# Patient Record
Sex: FEMALE | Race: BLACK OR AFRICAN AMERICAN
[De-identification: names, ages, dates, MRNs, and addresses within clinical notes are randomized per-mention and may not be internally consistent; named-entity substitution may affect disease eponyms.]

---

## 2018-03-30 ENCOUNTER — HOSPITAL ENCOUNTER (INPATIENT)
Dept: HOSPITAL 84 - D.M2 | Age: 53
LOS: 14 days | Discharge: HOME HEALTH | DRG: 871 | End: 2018-04-13
Attending: INTERNAL MEDICINE | Admitting: INTERNAL MEDICINE
Payer: MEDICARE

## 2018-03-30 VITALS
BODY MASS INDEX: 22.44 KG/M2 | BODY MASS INDEX: 22.44 KG/M2 | WEIGHT: 134.68 LBS | HEIGHT: 65 IN | WEIGHT: 134.68 LBS | HEIGHT: 65 IN | BODY MASS INDEX: 22.44 KG/M2

## 2018-03-30 VITALS — SYSTOLIC BLOOD PRESSURE: 131 MMHG | DIASTOLIC BLOOD PRESSURE: 70 MMHG

## 2018-03-30 VITALS — DIASTOLIC BLOOD PRESSURE: 85 MMHG | SYSTOLIC BLOOD PRESSURE: 201 MMHG

## 2018-03-30 DIAGNOSIS — N18.6: ICD-10-CM

## 2018-03-30 DIAGNOSIS — F12.90: ICD-10-CM

## 2018-03-30 DIAGNOSIS — Z99.2: ICD-10-CM

## 2018-03-30 DIAGNOSIS — H54.7: ICD-10-CM

## 2018-03-30 DIAGNOSIS — I13.2: ICD-10-CM

## 2018-03-30 DIAGNOSIS — I50.9: ICD-10-CM

## 2018-03-30 DIAGNOSIS — F32.9: ICD-10-CM

## 2018-03-30 DIAGNOSIS — F14.90: ICD-10-CM

## 2018-03-30 DIAGNOSIS — A41.02: Primary | ICD-10-CM

## 2018-03-30 DIAGNOSIS — N30.90: ICD-10-CM

## 2018-03-30 DIAGNOSIS — E11.22: ICD-10-CM

## 2018-03-30 DIAGNOSIS — A52.8: ICD-10-CM

## 2018-03-30 DIAGNOSIS — Z72.89: ICD-10-CM

## 2018-03-30 DIAGNOSIS — J44.9: ICD-10-CM

## 2018-03-30 DIAGNOSIS — L40.9: ICD-10-CM

## 2018-03-30 LAB
ANION GAP SERPL CALC-SCNC: 18.2 MMOL/L (ref 8–16)
BASOPHILS NFR BLD AUTO: 0.1 % (ref 0–2)
BUN SERPL-MCNC: 30 MG/DL (ref 7–18)
CALCIUM SERPL-MCNC: 7.2 MG/DL (ref 8.5–10.1)
CHLORIDE SERPL-SCNC: 99 MMOL/L (ref 98–107)
CO2 SERPL-SCNC: 23.5 MMOL/L (ref 21–32)
CREAT SERPL-MCNC: 4.4 MG/DL (ref 0.6–1.3)
EOSINOPHIL NFR BLD: 0 % (ref 0–7)
ERYTHROCYTE [DISTWIDTH] IN BLOOD BY AUTOMATED COUNT: 16.6 % (ref 11.5–14.5)
GLUCOSE SERPL-MCNC: 194 MG/DL (ref 74–106)
HCT VFR BLD CALC: 34.6 % (ref 36–48)
HGB BLD-MCNC: 11.1 G/DL (ref 12–16)
IMM GRANULOCYTES NFR BLD: 1.3 % (ref 0–5)
LYMPHOCYTES NFR BLD AUTO: 3 % (ref 15–50)
MCH RBC QN AUTO: 31.7 PG (ref 26–34)
MCHC RBC AUTO-ENTMCNC: 32.1 G/DL (ref 31–37)
MCV RBC: 98.9 FL (ref 80–100)
MONOCYTES NFR BLD: 4.5 % (ref 2–11)
NEUTROPHILS NFR BLD AUTO: 91.1 % (ref 40–80)
OSMOLALITY SERPL CALC.SUM OF ELEC: 284 MOSM/KG (ref 275–300)
PLATELET # BLD: 194 10X3/UL (ref 130–400)
PMV BLD AUTO: 10.9 FL (ref 7.4–10.4)
POTASSIUM SERPL-SCNC: 3.7 MMOL/L (ref 3.5–5.1)
RBC # BLD AUTO: 3.5 10X6/UL (ref 4–5.4)
SODIUM SERPL-SCNC: 137 MMOL/L (ref 136–145)
WBC # BLD AUTO: 16.5 10X3/UL (ref 4.8–10.8)

## 2018-03-31 VITALS — DIASTOLIC BLOOD PRESSURE: 67 MMHG | SYSTOLIC BLOOD PRESSURE: 135 MMHG

## 2018-03-31 VITALS — SYSTOLIC BLOOD PRESSURE: 124 MMHG | DIASTOLIC BLOOD PRESSURE: 49 MMHG

## 2018-03-31 VITALS — SYSTOLIC BLOOD PRESSURE: 129 MMHG | DIASTOLIC BLOOD PRESSURE: 79 MMHG

## 2018-03-31 VITALS — SYSTOLIC BLOOD PRESSURE: 138 MMHG | DIASTOLIC BLOOD PRESSURE: 84 MMHG

## 2018-03-31 VITALS — SYSTOLIC BLOOD PRESSURE: 132 MMHG | DIASTOLIC BLOOD PRESSURE: 74 MMHG

## 2018-03-31 LAB
AMORPHOUS SEDIMENT: (no result) /LPF
AMPHETAMINES UR QL SCN: NEGATIVE QUAL
APPEARANCE UR: (no result)
BACTERIA #/AREA URNS HPF: (no result) /HPF
BARBITURATES UR QL SCN: NEGATIVE QUAL
BENZODIAZ UR QL SCN: NEGATIVE QUAL
BILIRUB SERPL-MCNC: NEGATIVE MG/DL
BZE UR QL SCN: POSITIVE QUAL
CANNABINOIDS UR QL SCN: POSITIVE QUAL
COLOR UR: YELLOW
CRYSTALS #/AREA URNS HPF: (no result) /HPF
GLUCOSE SERPL-MCNC: 50 MG/DL
GRAN CASTS #/AREA URNS LPF: (no result) /LPF
HYALINE CASTS #/AREA URNS LPF: (no result) /LPF
KETONES UR STRIP-MCNC: NEGATIVE MG/DL
MUCOUS THREADS #/AREA URNS LPF: (no result) /LPF
NITRITE UR-MCNC: NEGATIVE MG/ML
OPIATES UR QL SCN: NEGATIVE QUAL
PCP UR QL SCN: NEGATIVE QUAL
PH UR STRIP: 5 [PH] (ref 5–6)
PROT UR-MCNC: (no result) MG/DL
RBC #/AREA URNS HPF: (no result) /HPF (ref 0–5)
SP GR UR STRIP: 1.02 (ref 1–1.02)
SQUAMOUS #/AREA URNS HPF: (no result) /HPF (ref 0–5)
UROBILINOGEN UR-MCNC: NORMAL MG/DL
WBC #/AREA URNS HPF: (no result) /HPF (ref 0–5)

## 2018-04-01 VITALS — DIASTOLIC BLOOD PRESSURE: 62 MMHG | SYSTOLIC BLOOD PRESSURE: 119 MMHG

## 2018-04-01 VITALS — SYSTOLIC BLOOD PRESSURE: 136 MMHG | DIASTOLIC BLOOD PRESSURE: 71 MMHG

## 2018-04-01 VITALS — DIASTOLIC BLOOD PRESSURE: 64 MMHG | SYSTOLIC BLOOD PRESSURE: 136 MMHG

## 2018-04-01 VITALS — SYSTOLIC BLOOD PRESSURE: 126 MMHG | DIASTOLIC BLOOD PRESSURE: 70 MMHG

## 2018-04-01 VITALS — SYSTOLIC BLOOD PRESSURE: 106 MMHG | DIASTOLIC BLOOD PRESSURE: 44 MMHG

## 2018-04-01 VITALS — DIASTOLIC BLOOD PRESSURE: 68 MMHG | SYSTOLIC BLOOD PRESSURE: 121 MMHG

## 2018-04-01 LAB
ANION GAP SERPL CALC-SCNC: 16.2 MMOL/L (ref 8–16)
BASOPHILS NFR BLD AUTO: 0.1 % (ref 0–2)
BUN SERPL-MCNC: 48 MG/DL (ref 7–18)
CALCIUM SERPL-MCNC: 6.4 MG/DL (ref 8.5–10.1)
CHLORIDE SERPL-SCNC: 95 MMOL/L (ref 98–107)
CO2 SERPL-SCNC: 22.9 MMOL/L (ref 21–32)
CREAT SERPL-MCNC: 6.9 MG/DL (ref 0.6–1.3)
EOSINOPHIL NFR BLD: 0.2 % (ref 0–7)
ERYTHROCYTE [DISTWIDTH] IN BLOOD BY AUTOMATED COUNT: 15.7 % (ref 11.5–14.5)
GLUCOSE SERPL-MCNC: 202 MG/DL (ref 74–106)
HCT VFR BLD CALC: 28.5 % (ref 36–48)
HGB BLD-MCNC: 9.4 G/DL (ref 12–16)
IMM GRANULOCYTES NFR BLD: 0.4 % (ref 0–5)
LYMPHOCYTES NFR BLD AUTO: 7 % (ref 15–50)
MCH RBC QN AUTO: 31.3 PG (ref 26–34)
MCHC RBC AUTO-ENTMCNC: 33 G/DL (ref 31–37)
MCV RBC: 95 FL (ref 80–100)
MONOCYTES NFR BLD: 10.9 % (ref 2–11)
NEUTROPHILS NFR BLD AUTO: 81.4 % (ref 40–80)
OSMOLALITY SERPL CALC.SUM OF ELEC: 281 MOSM/KG (ref 275–300)
PLATELET # BLD: 189 10X3/UL (ref 130–400)
PMV BLD AUTO: 11.1 FL (ref 7.4–10.4)
POTASSIUM SERPL-SCNC: 3.1 MMOL/L (ref 3.5–5.1)
RBC # BLD AUTO: 3 10X6/UL (ref 4–5.4)
SODIUM SERPL-SCNC: 131 MMOL/L (ref 136–145)
WBC # BLD AUTO: 11.1 10X3/UL (ref 4.8–10.8)

## 2018-04-02 VITALS — DIASTOLIC BLOOD PRESSURE: 59 MMHG | SYSTOLIC BLOOD PRESSURE: 101 MMHG

## 2018-04-02 VITALS — SYSTOLIC BLOOD PRESSURE: 115 MMHG | DIASTOLIC BLOOD PRESSURE: 52 MMHG

## 2018-04-02 VITALS — DIASTOLIC BLOOD PRESSURE: 65 MMHG | SYSTOLIC BLOOD PRESSURE: 115 MMHG

## 2018-04-02 VITALS — DIASTOLIC BLOOD PRESSURE: 52 MMHG | SYSTOLIC BLOOD PRESSURE: 115 MMHG

## 2018-04-02 VITALS — SYSTOLIC BLOOD PRESSURE: 122 MMHG | DIASTOLIC BLOOD PRESSURE: 57 MMHG

## 2018-04-02 VITALS — SYSTOLIC BLOOD PRESSURE: 130 MMHG | DIASTOLIC BLOOD PRESSURE: 62 MMHG

## 2018-04-02 LAB
ANION GAP SERPL CALC-SCNC: 18.1 MMOL/L (ref 8–16)
BASOPHILS NFR BLD AUTO: 0.3 % (ref 0–2)
BUN SERPL-MCNC: 52 MG/DL (ref 7–18)
CALCIUM SERPL-MCNC: 5.9 MG/DL (ref 8.5–10.1)
CHLORIDE SERPL-SCNC: 94 MMOL/L (ref 98–107)
CO2 SERPL-SCNC: 21.1 MMOL/L (ref 21–32)
CREAT SERPL-MCNC: 8.5 MG/DL (ref 0.6–1.3)
EOSINOPHIL NFR BLD: 2.2 % (ref 0–7)
ERYTHROCYTE [DISTWIDTH] IN BLOOD BY AUTOMATED COUNT: 15.4 % (ref 11.5–14.5)
GLUCOSE SERPL-MCNC: 110 MG/DL (ref 74–106)
HCT VFR BLD CALC: 25.6 % (ref 36–48)
HGB BLD-MCNC: 8.3 G/DL (ref 12–16)
IMM GRANULOCYTES NFR BLD: 0.6 % (ref 0–5)
LYMPHOCYTES NFR BLD AUTO: 8.8 % (ref 15–50)
MCH RBC QN AUTO: 30.5 PG (ref 26–34)
MCHC RBC AUTO-ENTMCNC: 32.4 G/DL (ref 31–37)
MCV RBC: 94.1 FL (ref 80–100)
MONOCYTES NFR BLD: 15.9 % (ref 2–11)
NEUTROPHILS NFR BLD AUTO: 72.2 % (ref 40–80)
OSMOLALITY SERPL CALC.SUM OF ELEC: 275 MOSM/KG (ref 275–300)
PHOSPHATE SERPL-MCNC: 5.7 MG/DL (ref 2.5–4.9)
PLATELET # BLD: 195 10X3/UL (ref 130–400)
PMV BLD AUTO: 11 FL (ref 7.4–10.4)
POTASSIUM SERPL-SCNC: 3.2 MMOL/L (ref 3.5–5.1)
RBC # BLD AUTO: 2.72 10X6/UL (ref 4–5.4)
SODIUM SERPL-SCNC: 130 MMOL/L (ref 136–145)
WBC # BLD AUTO: 6.9 10X3/UL (ref 4.8–10.8)

## 2018-04-02 PROCEDURE — 5A1D70Z PERFORMANCE OF URINARY FILTRATION, INTERMITTENT, LESS THAN 6 HOURS PER DAY: ICD-10-PCS | Performed by: INTERNAL MEDICINE

## 2018-04-03 VITALS — DIASTOLIC BLOOD PRESSURE: 56 MMHG | SYSTOLIC BLOOD PRESSURE: 108 MMHG

## 2018-04-03 VITALS — SYSTOLIC BLOOD PRESSURE: 136 MMHG | DIASTOLIC BLOOD PRESSURE: 76 MMHG

## 2018-04-03 VITALS — DIASTOLIC BLOOD PRESSURE: 72 MMHG | SYSTOLIC BLOOD PRESSURE: 142 MMHG

## 2018-04-03 VITALS — DIASTOLIC BLOOD PRESSURE: 58 MMHG | SYSTOLIC BLOOD PRESSURE: 117 MMHG

## 2018-04-03 VITALS — SYSTOLIC BLOOD PRESSURE: 125 MMHG | DIASTOLIC BLOOD PRESSURE: 68 MMHG

## 2018-04-03 VITALS — DIASTOLIC BLOOD PRESSURE: 61 MMHG | SYSTOLIC BLOOD PRESSURE: 136 MMHG

## 2018-04-03 LAB
ANION GAP SERPL CALC-SCNC: 14.4 MMOL/L (ref 8–16)
BASOPHILS NFR BLD AUTO: 0.7 % (ref 0–2)
BUN SERPL-MCNC: 36 MG/DL (ref 7–18)
CALCIUM SERPL-MCNC: 6.3 MG/DL (ref 8.5–10.1)
CHLORIDE SERPL-SCNC: 96 MMOL/L (ref 98–107)
CO2 SERPL-SCNC: 25.7 MMOL/L (ref 21–32)
CREAT SERPL-MCNC: 6.9 MG/DL (ref 0.6–1.3)
EOSINOPHIL NFR BLD: 3.2 % (ref 0–7)
ERYTHROCYTE [DISTWIDTH] IN BLOOD BY AUTOMATED COUNT: 15.4 % (ref 11.5–14.5)
GLUCOSE SERPL-MCNC: 102 MG/DL (ref 74–106)
HCT VFR BLD CALC: 27.5 % (ref 36–48)
HGB BLD-MCNC: 9 G/DL (ref 12–16)
IMM GRANULOCYTES NFR BLD: 0.7 % (ref 0–5)
LYMPHOCYTES NFR BLD AUTO: 16.7 % (ref 15–50)
MCH RBC QN AUTO: 30.5 PG (ref 26–34)
MCHC RBC AUTO-ENTMCNC: 32.7 G/DL (ref 31–37)
MCV RBC: 93.2 FL (ref 80–100)
MONOCYTES NFR BLD: 13.8 % (ref 2–11)
NEUTROPHILS NFR BLD AUTO: 64.9 % (ref 40–80)
OSMOLALITY SERPL CALC.SUM OF ELEC: 273 MOSM/KG (ref 275–300)
PHOSPHATE SERPL-MCNC: 4.6 MG/DL (ref 2.5–4.9)
PLATELET # BLD: 195 10X3/UL (ref 130–400)
PMV BLD AUTO: 10.4 FL (ref 7.4–10.4)
POTASSIUM SERPL-SCNC: 3.1 MMOL/L (ref 3.5–5.1)
RBC # BLD AUTO: 2.95 10X6/UL (ref 4–5.4)
SODIUM SERPL-SCNC: 133 MMOL/L (ref 136–145)
WBC # BLD AUTO: 5.6 10X3/UL (ref 4.8–10.8)

## 2018-04-04 VITALS — SYSTOLIC BLOOD PRESSURE: 134 MMHG | DIASTOLIC BLOOD PRESSURE: 53 MMHG

## 2018-04-04 VITALS — DIASTOLIC BLOOD PRESSURE: 58 MMHG | SYSTOLIC BLOOD PRESSURE: 93 MMHG

## 2018-04-04 VITALS — DIASTOLIC BLOOD PRESSURE: 63 MMHG | SYSTOLIC BLOOD PRESSURE: 123 MMHG

## 2018-04-04 VITALS — SYSTOLIC BLOOD PRESSURE: 113 MMHG | DIASTOLIC BLOOD PRESSURE: 60 MMHG

## 2018-04-04 VITALS — SYSTOLIC BLOOD PRESSURE: 112 MMHG | DIASTOLIC BLOOD PRESSURE: 60 MMHG

## 2018-04-04 VITALS — SYSTOLIC BLOOD PRESSURE: 116 MMHG | DIASTOLIC BLOOD PRESSURE: 64 MMHG

## 2018-04-04 LAB
ANION GAP SERPL CALC-SCNC: 17.4 MMOL/L (ref 8–16)
BASOPHILS NFR BLD AUTO: 0.4 % (ref 0–2)
BUN SERPL-MCNC: 42 MG/DL (ref 7–18)
CALCIUM SERPL-MCNC: 6.2 MG/DL (ref 8.5–10.1)
CHLORIDE SERPL-SCNC: 96 MMOL/L (ref 98–107)
CO2 SERPL-SCNC: 23.1 MMOL/L (ref 21–32)
CREAT SERPL-MCNC: 7.9 MG/DL (ref 0.6–1.3)
EOSINOPHIL NFR BLD: 4.8 % (ref 0–7)
ERYTHROCYTE [DISTWIDTH] IN BLOOD BY AUTOMATED COUNT: 15.3 % (ref 11.5–14.5)
GLUCOSE SERPL-MCNC: 98 MG/DL (ref 74–106)
HCT VFR BLD CALC: 25.6 % (ref 36–48)
HGB BLD-MCNC: 8.3 G/DL (ref 12–16)
IMM GRANULOCYTES NFR BLD: 0.6 % (ref 0–5)
LYMPHOCYTES NFR BLD AUTO: 17.8 % (ref 15–50)
MCH RBC QN AUTO: 30.5 PG (ref 26–34)
MCHC RBC AUTO-ENTMCNC: 32.4 G/DL (ref 31–37)
MCV RBC: 94.1 FL (ref 80–100)
MONOCYTES NFR BLD: 16 % (ref 2–11)
NEUTROPHILS NFR BLD AUTO: 60.4 % (ref 40–80)
OSMOLALITY SERPL CALC.SUM OF ELEC: 276 MOSM/KG (ref 275–300)
PHOSPHATE SERPL-MCNC: 5 MG/DL (ref 2.5–4.9)
PLATELET # BLD: 213 10X3/UL (ref 130–400)
PMV BLD AUTO: 9.9 FL (ref 7.4–10.4)
POTASSIUM SERPL-SCNC: 3.5 MMOL/L (ref 3.5–5.1)
RBC # BLD AUTO: 2.72 10X6/UL (ref 4–5.4)
SODIUM SERPL-SCNC: 133 MMOL/L (ref 136–145)
VANCOMYCIN SERPL-MCNC: 27.7 UG/ML (ref 10–20)
WBC # BLD AUTO: 5.5 10X3/UL (ref 4.8–10.8)

## 2018-04-05 VITALS — DIASTOLIC BLOOD PRESSURE: 58 MMHG | SYSTOLIC BLOOD PRESSURE: 103 MMHG

## 2018-04-05 VITALS — SYSTOLIC BLOOD PRESSURE: 143 MMHG | DIASTOLIC BLOOD PRESSURE: 93 MMHG

## 2018-04-05 VITALS — DIASTOLIC BLOOD PRESSURE: 57 MMHG | SYSTOLIC BLOOD PRESSURE: 120 MMHG

## 2018-04-05 VITALS — SYSTOLIC BLOOD PRESSURE: 137 MMHG | DIASTOLIC BLOOD PRESSURE: 59 MMHG

## 2018-04-05 VITALS — SYSTOLIC BLOOD PRESSURE: 125 MMHG | DIASTOLIC BLOOD PRESSURE: 69 MMHG

## 2018-04-05 VITALS — DIASTOLIC BLOOD PRESSURE: 74 MMHG | SYSTOLIC BLOOD PRESSURE: 134 MMHG

## 2018-04-05 LAB
ANION GAP SERPL CALC-SCNC: 15.2 MMOL/L (ref 8–16)
BASOPHILS NFR BLD AUTO: 0.4 % (ref 0–2)
BUN SERPL-MCNC: 27 MG/DL (ref 7–18)
CALCIUM SERPL-MCNC: 7 MG/DL (ref 8.5–10.1)
CHLORIDE SERPL-SCNC: 98 MMOL/L (ref 98–107)
CO2 SERPL-SCNC: 26.6 MMOL/L (ref 21–32)
CREAT SERPL-MCNC: 5.8 MG/DL (ref 0.6–1.3)
EOSINOPHIL NFR BLD: 4.3 % (ref 0–7)
ERYTHROCYTE [DISTWIDTH] IN BLOOD BY AUTOMATED COUNT: 15 % (ref 11.5–14.5)
GLUCOSE SERPL-MCNC: 100 MG/DL (ref 74–106)
HCT VFR BLD CALC: 27.4 % (ref 36–48)
HGB BLD-MCNC: 8.9 G/DL (ref 12–16)
IMM GRANULOCYTES NFR BLD: 1.1 % (ref 0–5)
LYMPHOCYTES NFR BLD AUTO: 16.1 % (ref 15–50)
MCH RBC QN AUTO: 30.5 PG (ref 26–34)
MCHC RBC AUTO-ENTMCNC: 32.5 G/DL (ref 31–37)
MCV RBC: 93.8 FL (ref 80–100)
MONOCYTES NFR BLD: 16.1 % (ref 2–11)
NEUTROPHILS NFR BLD AUTO: 62 % (ref 40–80)
OSMOLALITY SERPL CALC.SUM OF ELEC: 276 MOSM/KG (ref 275–300)
PLATELET # BLD: 244 10X3/UL (ref 130–400)
PMV BLD AUTO: 10.1 FL (ref 7.4–10.4)
POTASSIUM SERPL-SCNC: 3.8 MMOL/L (ref 3.5–5.1)
RBC # BLD AUTO: 2.92 10X6/UL (ref 4–5.4)
SODIUM SERPL-SCNC: 136 MMOL/L (ref 136–145)
VANCOMYCIN PEAK SERPL-MCNC: 21.4 UG/ML (ref 18–26)
WBC # BLD AUTO: 5.5 10X3/UL (ref 4.8–10.8)

## 2018-04-06 VITALS — SYSTOLIC BLOOD PRESSURE: 136 MMHG | DIASTOLIC BLOOD PRESSURE: 66 MMHG

## 2018-04-06 VITALS — SYSTOLIC BLOOD PRESSURE: 136 MMHG | DIASTOLIC BLOOD PRESSURE: 76 MMHG

## 2018-04-06 VITALS — DIASTOLIC BLOOD PRESSURE: 74 MMHG | SYSTOLIC BLOOD PRESSURE: 133 MMHG

## 2018-04-06 VITALS — SYSTOLIC BLOOD PRESSURE: 128 MMHG | DIASTOLIC BLOOD PRESSURE: 74 MMHG

## 2018-04-06 VITALS — SYSTOLIC BLOOD PRESSURE: 130 MMHG | DIASTOLIC BLOOD PRESSURE: 62 MMHG

## 2018-04-06 VITALS — DIASTOLIC BLOOD PRESSURE: 72 MMHG | SYSTOLIC BLOOD PRESSURE: 132 MMHG

## 2018-04-06 LAB
ANION GAP SERPL CALC-SCNC: 14.5 MMOL/L (ref 8–16)
BASOPHILS NFR BLD AUTO: 0.6 % (ref 0–2)
BUN SERPL-MCNC: 33 MG/DL (ref 7–18)
CALCIUM SERPL-MCNC: 7 MG/DL (ref 8.5–10.1)
CHLORIDE SERPL-SCNC: 98 MMOL/L (ref 98–107)
CO2 SERPL-SCNC: 26.1 MMOL/L (ref 21–32)
CREAT SERPL-MCNC: 7 MG/DL (ref 0.6–1.3)
EOSINOPHIL NFR BLD: 5.3 % (ref 0–7)
ERYTHROCYTE [DISTWIDTH] IN BLOOD BY AUTOMATED COUNT: 15 % (ref 11.5–14.5)
GLUCOSE SERPL-MCNC: 106 MG/DL (ref 74–106)
HCT VFR BLD CALC: 27.8 % (ref 36–48)
HGB BLD-MCNC: 9 G/DL (ref 12–16)
IMM GRANULOCYTES NFR BLD: 0.8 % (ref 0–5)
LYMPHOCYTES NFR BLD AUTO: 24.8 % (ref 15–50)
MCH RBC QN AUTO: 30.6 PG (ref 26–34)
MCHC RBC AUTO-ENTMCNC: 32.4 G/DL (ref 31–37)
MCV RBC: 94.6 FL (ref 80–100)
MONOCYTES NFR BLD: 11.8 % (ref 2–11)
NEUTROPHILS NFR BLD AUTO: 56.7 % (ref 40–80)
OSMOLALITY SERPL CALC.SUM OF ELEC: 276 MOSM/KG (ref 275–300)
PLATELET # BLD: 292 10X3/UL (ref 130–400)
PMV BLD AUTO: 9.9 FL (ref 7.4–10.4)
POTASSIUM SERPL-SCNC: 3.6 MMOL/L (ref 3.5–5.1)
RBC # BLD AUTO: 2.94 10X6/UL (ref 4–5.4)
SODIUM SERPL-SCNC: 135 MMOL/L (ref 136–145)
VANCOMYCIN PEAK SERPL-MCNC: 32.5 UG/ML (ref 18–26)
WBC # BLD AUTO: 5.2 10X3/UL (ref 4.8–10.8)

## 2018-04-07 VITALS — SYSTOLIC BLOOD PRESSURE: 142 MMHG | DIASTOLIC BLOOD PRESSURE: 78 MMHG

## 2018-04-07 VITALS — DIASTOLIC BLOOD PRESSURE: 78 MMHG | SYSTOLIC BLOOD PRESSURE: 151 MMHG

## 2018-04-07 VITALS — SYSTOLIC BLOOD PRESSURE: 147 MMHG | DIASTOLIC BLOOD PRESSURE: 62 MMHG

## 2018-04-07 VITALS — DIASTOLIC BLOOD PRESSURE: 72 MMHG | SYSTOLIC BLOOD PRESSURE: 134 MMHG

## 2018-04-07 VITALS — DIASTOLIC BLOOD PRESSURE: 81 MMHG | SYSTOLIC BLOOD PRESSURE: 157 MMHG

## 2018-04-07 LAB
ANION GAP SERPL CALC-SCNC: 14 MMOL/L (ref 8–16)
BASOPHILS NFR BLD AUTO: 0.3 % (ref 0–2)
BUN SERPL-MCNC: 25 MG/DL (ref 7–18)
CALCIUM SERPL-MCNC: 7.1 MG/DL (ref 8.5–10.1)
CHLORIDE SERPL-SCNC: 99 MMOL/L (ref 98–107)
CO2 SERPL-SCNC: 27.8 MMOL/L (ref 21–32)
CREAT SERPL-MCNC: 5.2 MG/DL (ref 0.6–1.3)
EOSINOPHIL NFR BLD: 3.8 % (ref 0–7)
ERYTHROCYTE [DISTWIDTH] IN BLOOD BY AUTOMATED COUNT: 14.9 % (ref 11.5–14.5)
GLUCOSE SERPL-MCNC: 114 MG/DL (ref 74–106)
HCT VFR BLD CALC: 26.8 % (ref 36–48)
HGB BLD-MCNC: 8.7 G/DL (ref 12–16)
IMM GRANULOCYTES NFR BLD: 0.8 % (ref 0–5)
LYMPHOCYTES NFR BLD AUTO: 15.2 % (ref 15–50)
MCH RBC QN AUTO: 30.2 PG (ref 26–34)
MCHC RBC AUTO-ENTMCNC: 32.5 G/DL (ref 31–37)
MCV RBC: 93.1 FL (ref 80–100)
MONOCYTES NFR BLD: 12.8 % (ref 2–11)
NEUTROPHILS NFR BLD AUTO: 67.1 % (ref 40–80)
OSMOLALITY SERPL CALC.SUM OF ELEC: 278 MOSM/KG (ref 275–300)
PLATELET # BLD: 321 10X3/UL (ref 130–400)
PMV BLD AUTO: 9.5 FL (ref 7.4–10.4)
POTASSIUM SERPL-SCNC: 3.8 MMOL/L (ref 3.5–5.1)
RBC # BLD AUTO: 2.88 10X6/UL (ref 4–5.4)
SODIUM SERPL-SCNC: 137 MMOL/L (ref 136–145)
VANCOMYCIN PEAK SERPL-MCNC: 26.5 UG/ML (ref 18–26)
WBC # BLD AUTO: 6.4 10X3/UL (ref 4.8–10.8)

## 2018-04-08 VITALS — SYSTOLIC BLOOD PRESSURE: 131 MMHG | DIASTOLIC BLOOD PRESSURE: 71 MMHG

## 2018-04-08 VITALS — SYSTOLIC BLOOD PRESSURE: 155 MMHG | DIASTOLIC BLOOD PRESSURE: 83 MMHG

## 2018-04-08 VITALS — DIASTOLIC BLOOD PRESSURE: 74 MMHG | SYSTOLIC BLOOD PRESSURE: 131 MMHG

## 2018-04-08 VITALS — DIASTOLIC BLOOD PRESSURE: 65 MMHG | SYSTOLIC BLOOD PRESSURE: 120 MMHG

## 2018-04-08 LAB
ANION GAP SERPL CALC-SCNC: 16.5 MMOL/L (ref 8–16)
BASOPHILS NFR BLD AUTO: 0.5 % (ref 0–2)
BUN SERPL-MCNC: 31 MG/DL (ref 7–18)
CALCIUM SERPL-MCNC: 7.3 MG/DL (ref 8.5–10.1)
CHLORIDE SERPL-SCNC: 99 MMOL/L (ref 98–107)
CO2 SERPL-SCNC: 26.3 MMOL/L (ref 21–32)
CREAT SERPL-MCNC: 6.5 MG/DL (ref 0.6–1.3)
EOSINOPHIL NFR BLD: 3 % (ref 0–7)
ERYTHROCYTE [DISTWIDTH] IN BLOOD BY AUTOMATED COUNT: 15 % (ref 11.5–14.5)
FERRITIN SERPL-MCNC: 292 NG/ML (ref 3–244)
GLUCOSE SERPL-MCNC: 101 MG/DL (ref 74–106)
HCT VFR BLD CALC: 27.7 % (ref 36–48)
HGB BLD-MCNC: 8.9 G/DL (ref 12–16)
IMM GRANULOCYTES NFR BLD: 0.8 % (ref 0–5)
IRON SERPL-MCNC: 60 UG/DL (ref 35–150)
LYMPHOCYTES NFR BLD AUTO: 17.6 % (ref 15–50)
MCH RBC QN AUTO: 30.3 PG (ref 26–34)
MCHC RBC AUTO-ENTMCNC: 32.1 G/DL (ref 31–37)
MCV RBC: 94.2 FL (ref 80–100)
MONOCYTES NFR BLD: 10.5 % (ref 2–11)
NEUTROPHILS NFR BLD AUTO: 67.6 % (ref 40–80)
OSMOLALITY SERPL CALC.SUM OF ELEC: 282 MOSM/KG (ref 275–300)
PHOSPHATE SERPL-MCNC: 4.2 MG/DL (ref 2.5–4.9)
PLATELET # BLD: 402 10X3/UL (ref 130–400)
PMV BLD AUTO: 9.4 FL (ref 7.4–10.4)
POTASSIUM SERPL-SCNC: 3.8 MMOL/L (ref 3.5–5.1)
RBC # BLD AUTO: 2.94 10X6/UL (ref 4–5.4)
SAO2 % BLD FROM PO2: 31 % (ref 15–55)
SODIUM SERPL-SCNC: 138 MMOL/L (ref 136–145)
TIBC SERPL-MCNC: 189 UG/DL (ref 260–445)
UIBC SERPL-MCNC: 129 UG/DL (ref 150–375)
VANCOMYCIN PEAK SERPL-MCNC: 23.3 UG/ML (ref 18–26)
WBC # BLD AUTO: 6.3 10X3/UL (ref 4.8–10.8)

## 2018-04-09 VITALS — DIASTOLIC BLOOD PRESSURE: 75 MMHG | SYSTOLIC BLOOD PRESSURE: 139 MMHG

## 2018-04-09 VITALS — SYSTOLIC BLOOD PRESSURE: 126 MMHG | DIASTOLIC BLOOD PRESSURE: 79 MMHG

## 2018-04-09 VITALS — DIASTOLIC BLOOD PRESSURE: 73 MMHG | SYSTOLIC BLOOD PRESSURE: 124 MMHG

## 2018-04-09 VITALS — DIASTOLIC BLOOD PRESSURE: 76 MMHG | SYSTOLIC BLOOD PRESSURE: 129 MMHG

## 2018-04-09 VITALS — SYSTOLIC BLOOD PRESSURE: 131 MMHG | DIASTOLIC BLOOD PRESSURE: 72 MMHG

## 2018-04-09 LAB
ANION GAP SERPL CALC-SCNC: 17.4 MMOL/L (ref 8–16)
BASOPHILS NFR BLD AUTO: 0.8 % (ref 0–2)
BUN SERPL-MCNC: 41 MG/DL (ref 7–18)
CALCIUM SERPL-MCNC: 7.8 MG/DL (ref 8.5–10.1)
CHLORIDE SERPL-SCNC: 97 MMOL/L (ref 98–107)
CO2 SERPL-SCNC: 26.3 MMOL/L (ref 21–32)
CREAT SERPL-MCNC: 7.2 MG/DL (ref 0.6–1.3)
EOSINOPHIL NFR BLD: 3.1 % (ref 0–7)
ERYTHROCYTE [DISTWIDTH] IN BLOOD BY AUTOMATED COUNT: 15 % (ref 11.5–14.5)
GLUCOSE SERPL-MCNC: 100 MG/DL (ref 74–106)
HCT VFR BLD CALC: 29.2 % (ref 36–48)
HGB BLD-MCNC: 9.5 G/DL (ref 12–16)
IMM GRANULOCYTES NFR BLD: 0.8 % (ref 0–5)
INR PPP: 0.96 (ref 0.85–1.17)
LYMPHOCYTES NFR BLD AUTO: 22.5 % (ref 15–50)
MCH RBC QN AUTO: 30.4 PG (ref 26–34)
MCHC RBC AUTO-ENTMCNC: 32.5 G/DL (ref 31–37)
MCV RBC: 93.3 FL (ref 80–100)
MONOCYTES NFR BLD: 5.5 % (ref 2–11)
NEUTROPHILS NFR BLD AUTO: 67.3 % (ref 40–80)
OSMOLALITY SERPL CALC.SUM OF ELEC: 281 MOSM/KG (ref 275–300)
PLATELET # BLD: 458 10X3/UL (ref 130–400)
PMV BLD AUTO: 9.6 FL (ref 7.4–10.4)
POTASSIUM SERPL-SCNC: 4.7 MMOL/L (ref 3.5–5.1)
PROTHROMBIN TIME: 12.4 SECONDS (ref 11.6–15)
RBC # BLD AUTO: 3.13 10X6/UL (ref 4–5.4)
SODIUM SERPL-SCNC: 136 MMOL/L (ref 136–145)
VANCOMYCIN PEAK SERPL-MCNC: 19.8 UG/ML (ref 18–26)
WBC # BLD AUTO: 7.1 10X3/UL (ref 4.8–10.8)

## 2018-04-10 VITALS — DIASTOLIC BLOOD PRESSURE: 56 MMHG | SYSTOLIC BLOOD PRESSURE: 140 MMHG

## 2018-04-10 VITALS — DIASTOLIC BLOOD PRESSURE: 82 MMHG | SYSTOLIC BLOOD PRESSURE: 140 MMHG

## 2018-04-10 VITALS — DIASTOLIC BLOOD PRESSURE: 69 MMHG | SYSTOLIC BLOOD PRESSURE: 128 MMHG

## 2018-04-10 VITALS — DIASTOLIC BLOOD PRESSURE: 78 MMHG | SYSTOLIC BLOOD PRESSURE: 136 MMHG

## 2018-04-10 VITALS — SYSTOLIC BLOOD PRESSURE: 140 MMHG | DIASTOLIC BLOOD PRESSURE: 82 MMHG

## 2018-04-10 VITALS — DIASTOLIC BLOOD PRESSURE: 79 MMHG | SYSTOLIC BLOOD PRESSURE: 150 MMHG

## 2018-04-10 VITALS — DIASTOLIC BLOOD PRESSURE: 77 MMHG | SYSTOLIC BLOOD PRESSURE: 142 MMHG

## 2018-04-10 LAB
ANION GAP SERPL CALC-SCNC: 13.3 MMOL/L (ref 8–16)
BASOPHILS NFR BLD AUTO: 1 % (ref 0–2)
BUN SERPL-MCNC: 17 MG/DL (ref 7–18)
CALCIUM SERPL-MCNC: 7.2 MG/DL (ref 8.5–10.1)
CHLORIDE SERPL-SCNC: 101 MMOL/L (ref 98–107)
CO2 SERPL-SCNC: 28.9 MMOL/L (ref 21–32)
CREAT SERPL-MCNC: 4.5 MG/DL (ref 0.6–1.3)
EOSINOPHIL NFR BLD: 1.6 % (ref 0–7)
ERYTHROCYTE [DISTWIDTH] IN BLOOD BY AUTOMATED COUNT: 14.9 % (ref 11.5–14.5)
FOLATE SERPL-MCNC: 8.1 NG/ML (ref 3–?)
GLUCOSE SERPL-MCNC: 199 MG/DL (ref 74–106)
HCT VFR BLD CALC: 24.8 % (ref 36–48)
HGB BLD-MCNC: 8 G/DL (ref 12–16)
IMM GRANULOCYTES NFR BLD: 0.6 % (ref 0–5)
LYMPHOCYTES NFR BLD AUTO: 13.9 % (ref 15–50)
MCH RBC QN AUTO: 30.7 PG (ref 26–34)
MCHC RBC AUTO-ENTMCNC: 32.3 G/DL (ref 31–37)
MCV RBC: 95 FL (ref 80–100)
MONOCYTES NFR BLD: 7.1 % (ref 2–11)
NEUTROPHILS NFR BLD AUTO: 75.8 % (ref 40–80)
OSMOLALITY SERPL CALC.SUM OF ELEC: 285 MOSM/KG (ref 275–300)
PHOSPHATE SERPL-MCNC: 3.4 MG/DL (ref 2.5–4.9)
PLATELET # BLD: 406 10X3/UL (ref 130–400)
PMV BLD AUTO: 8.8 FL (ref 7.4–10.4)
POTASSIUM SERPL-SCNC: 4.2 MMOL/L (ref 3.5–5.1)
RBC # BLD AUTO: 2.61 10X6/UL (ref 4–5.4)
SODIUM SERPL-SCNC: 139 MMOL/L (ref 136–145)
VANCOMYCIN PEAK SERPL-MCNC: 14.1 UG/ML (ref 18–26)
VANCOMYCIN SERPL-MCNC: 14.1 UG/ML (ref 10–20)
VIT B12 SERPL-MCNC: 1412 PG/ML (ref 232–1245)
WBC # BLD AUTO: 7.1 10X3/UL (ref 4.8–10.8)

## 2018-04-10 PROCEDURE — 0J2SXYZ CHANGE OTHER DEVICE IN HEAD AND NECK SUBCUTANEOUS TISSUE AND FASCIA, EXTERNAL APPROACH: ICD-10-PCS | Performed by: SURGERY

## 2018-04-11 VITALS — SYSTOLIC BLOOD PRESSURE: 145 MMHG | DIASTOLIC BLOOD PRESSURE: 76 MMHG

## 2018-04-11 VITALS — SYSTOLIC BLOOD PRESSURE: 145 MMHG | DIASTOLIC BLOOD PRESSURE: 71 MMHG

## 2018-04-11 VITALS — DIASTOLIC BLOOD PRESSURE: 75 MMHG | SYSTOLIC BLOOD PRESSURE: 140 MMHG

## 2018-04-11 VITALS — DIASTOLIC BLOOD PRESSURE: 78 MMHG | SYSTOLIC BLOOD PRESSURE: 123 MMHG

## 2018-04-11 VITALS — DIASTOLIC BLOOD PRESSURE: 53 MMHG | SYSTOLIC BLOOD PRESSURE: 127 MMHG

## 2018-04-11 VITALS — SYSTOLIC BLOOD PRESSURE: 161 MMHG | DIASTOLIC BLOOD PRESSURE: 93 MMHG

## 2018-04-11 LAB
ANION GAP SERPL CALC-SCNC: 18.2 MMOL/L (ref 8–16)
BASOPHILS NFR BLD AUTO: 0.3 % (ref 0–2)
BUN SERPL-MCNC: 30 MG/DL (ref 7–18)
CALCIUM SERPL-MCNC: 8.1 MG/DL (ref 8.5–10.1)
CHLORIDE SERPL-SCNC: 98 MMOL/L (ref 98–107)
CO2 SERPL-SCNC: 25.8 MMOL/L (ref 21–32)
CREAT SERPL-MCNC: 5.6 MG/DL (ref 0.6–1.3)
EOSINOPHIL NFR BLD: 0.2 % (ref 0–7)
ERYTHROCYTE [DISTWIDTH] IN BLOOD BY AUTOMATED COUNT: 15.3 % (ref 11.5–14.5)
GLUCOSE SERPL-MCNC: 40 MG/DL (ref 74–106)
HCT VFR BLD CALC: 28.7 % (ref 36–48)
HGB BLD-MCNC: 9 G/DL (ref 12–16)
IMM GRANULOCYTES NFR BLD: 0.6 % (ref 0–5)
LYMPHOCYTES NFR BLD AUTO: 12.2 % (ref 15–50)
MCH RBC QN AUTO: 30.2 PG (ref 26–34)
MCHC RBC AUTO-ENTMCNC: 31.4 G/DL (ref 31–37)
MCV RBC: 96.3 FL (ref 80–100)
MONOCYTES NFR BLD: 6.4 % (ref 2–11)
NEUTROPHILS NFR BLD AUTO: 80.3 % (ref 40–80)
OSMOLALITY SERPL CALC.SUM OF ELEC: 278 MOSM/KG (ref 275–300)
PLATELET # BLD: 539 10X3/UL (ref 130–400)
PMV BLD AUTO: 9.3 FL (ref 7.4–10.4)
POTASSIUM SERPL-SCNC: 4 MMOL/L (ref 3.5–5.1)
RBC # BLD AUTO: 2.98 10X6/UL (ref 4–5.4)
SODIUM SERPL-SCNC: 138 MMOL/L (ref 136–145)
VANCOMYCIN PEAK SERPL-MCNC: 13.3 UG/ML (ref 18–26)
WBC # BLD AUTO: 12 10X3/UL (ref 4.8–10.8)

## 2018-04-12 VITALS — DIASTOLIC BLOOD PRESSURE: 85 MMHG | SYSTOLIC BLOOD PRESSURE: 155 MMHG

## 2018-04-12 VITALS — DIASTOLIC BLOOD PRESSURE: 70 MMHG | SYSTOLIC BLOOD PRESSURE: 130 MMHG

## 2018-04-12 VITALS — SYSTOLIC BLOOD PRESSURE: 138 MMHG | DIASTOLIC BLOOD PRESSURE: 77 MMHG

## 2018-04-12 VITALS — SYSTOLIC BLOOD PRESSURE: 130 MMHG | DIASTOLIC BLOOD PRESSURE: 70 MMHG

## 2018-04-12 LAB
ANION GAP SERPL CALC-SCNC: 12.3 MMOL/L (ref 8–16)
BASOPHILS NFR BLD AUTO: 0.6 % (ref 0–2)
BUN SERPL-MCNC: 22 MG/DL (ref 7–18)
CALCIUM SERPL-MCNC: 7.8 MG/DL (ref 8.5–10.1)
CHLORIDE SERPL-SCNC: 102 MMOL/L (ref 98–107)
CO2 SERPL-SCNC: 29.1 MMOL/L (ref 21–32)
CREAT SERPL-MCNC: 4.1 MG/DL (ref 0.6–1.3)
EOSINOPHIL NFR BLD: 1.5 % (ref 0–7)
ERYTHROCYTE [DISTWIDTH] IN BLOOD BY AUTOMATED COUNT: 15.5 % (ref 11.5–14.5)
GLUCOSE SERPL-MCNC: 115 MG/DL (ref 74–106)
HCT VFR BLD CALC: 25.5 % (ref 36–48)
HGB BLD-MCNC: 8 G/DL (ref 12–16)
IMM GRANULOCYTES NFR BLD: 0.7 % (ref 0–5)
LYMPHOCYTES NFR BLD AUTO: 17.1 % (ref 15–50)
MCH RBC QN AUTO: 30.5 PG (ref 26–34)
MCHC RBC AUTO-ENTMCNC: 31.4 G/DL (ref 31–37)
MCV RBC: 97.3 FL (ref 80–100)
MONOCYTES NFR BLD: 6.7 % (ref 2–11)
NEUTROPHILS NFR BLD AUTO: 73.4 % (ref 40–80)
OSMOLALITY SERPL CALC.SUM OF ELEC: 281 MOSM/KG (ref 275–300)
PHOSPHATE SERPL-MCNC: 2.9 MG/DL (ref 2.5–4.9)
PLATELET # BLD: 441 10X3/UL (ref 130–400)
PMV BLD AUTO: 8.9 FL (ref 7.4–10.4)
POTASSIUM SERPL-SCNC: 4.4 MMOL/L (ref 3.5–5.1)
RBC # BLD AUTO: 2.62 10X6/UL (ref 4–5.4)
SODIUM SERPL-SCNC: 139 MMOL/L (ref 136–145)
VANCOMYCIN PEAK SERPL-MCNC: 19.1 UG/ML (ref 18–26)
VANCOMYCIN SERPL-MCNC: 19.1 UG/ML (ref 10–20)
WBC # BLD AUTO: 8.4 10X3/UL (ref 4.8–10.8)

## 2018-04-13 VITALS — DIASTOLIC BLOOD PRESSURE: 70 MMHG | SYSTOLIC BLOOD PRESSURE: 140 MMHG

## 2018-04-13 VITALS — DIASTOLIC BLOOD PRESSURE: 47 MMHG | SYSTOLIC BLOOD PRESSURE: 148 MMHG

## 2018-04-13 VITALS — SYSTOLIC BLOOD PRESSURE: 140 MMHG | DIASTOLIC BLOOD PRESSURE: 80 MMHG

## 2018-04-13 VITALS — SYSTOLIC BLOOD PRESSURE: 142 MMHG | DIASTOLIC BLOOD PRESSURE: 72 MMHG

## 2018-04-13 LAB
ANION GAP SERPL CALC-SCNC: 11.4 MMOL/L (ref 8–16)
BASOPHILS NFR BLD AUTO: 0.6 % (ref 0–2)
BUN SERPL-MCNC: 40 MG/DL (ref 7–18)
CALCIUM SERPL-MCNC: 8.3 MG/DL (ref 8.5–10.1)
CHLORIDE SERPL-SCNC: 104 MMOL/L (ref 98–107)
CO2 SERPL-SCNC: 27.8 MMOL/L (ref 21–32)
CREAT SERPL-MCNC: 5.3 MG/DL (ref 0.6–1.3)
EOSINOPHIL NFR BLD: 1.6 % (ref 0–7)
ERYTHROCYTE [DISTWIDTH] IN BLOOD BY AUTOMATED COUNT: 15.8 % (ref 11.5–14.5)
GLUCOSE SERPL-MCNC: 156 MG/DL (ref 74–106)
HCT VFR BLD CALC: 24.9 % (ref 36–48)
HGB BLD-MCNC: 7.9 G/DL (ref 12–16)
IMM GRANULOCYTES NFR BLD: 0.7 % (ref 0–5)
LYMPHOCYTES NFR BLD AUTO: 15.7 % (ref 15–50)
MCH RBC QN AUTO: 30.6 PG (ref 26–34)
MCHC RBC AUTO-ENTMCNC: 31.7 G/DL (ref 31–37)
MCV RBC: 96.5 FL (ref 80–100)
MONOCYTES NFR BLD: 8.3 % (ref 2–11)
NEUTROPHILS NFR BLD AUTO: 73.1 % (ref 40–80)
OSMOLALITY SERPL CALC.SUM OF ELEC: 290 MOSM/KG (ref 275–300)
PLATELET # BLD: 496 10X3/UL (ref 130–400)
PMV BLD AUTO: 9 FL (ref 7.4–10.4)
POTASSIUM SERPL-SCNC: 4.2 MMOL/L (ref 3.5–5.1)
RBC # BLD AUTO: 2.58 10X6/UL (ref 4–5.4)
SODIUM SERPL-SCNC: 139 MMOL/L (ref 136–145)
WBC # BLD AUTO: 9.4 10X3/UL (ref 4.8–10.8)

## 2018-11-16 ENCOUNTER — HOSPITAL ENCOUNTER (INPATIENT)
Dept: HOSPITAL 84 - D.ER | Age: 53
LOS: 16 days | Discharge: ANOTHER HOSPITAL | DRG: 239 | End: 2018-12-02
Attending: INTERNAL MEDICINE | Admitting: INTERNAL MEDICINE
Payer: MEDICARE

## 2018-11-16 VITALS — HEIGHT: 65 IN | BODY MASS INDEX: 20.8 KG/M2 | BODY MASS INDEX: 20.8 KG/M2 | HEIGHT: 65 IN

## 2018-11-16 VITALS — SYSTOLIC BLOOD PRESSURE: 104 MMHG | DIASTOLIC BLOOD PRESSURE: 48 MMHG

## 2018-11-16 VITALS — SYSTOLIC BLOOD PRESSURE: 96 MMHG | DIASTOLIC BLOOD PRESSURE: 60 MMHG

## 2018-11-16 DIAGNOSIS — Z99.2: ICD-10-CM

## 2018-11-16 DIAGNOSIS — E87.1: ICD-10-CM

## 2018-11-16 DIAGNOSIS — A41.02: ICD-10-CM

## 2018-11-16 DIAGNOSIS — K21.9: ICD-10-CM

## 2018-11-16 DIAGNOSIS — M00.072: ICD-10-CM

## 2018-11-16 DIAGNOSIS — E11.40: ICD-10-CM

## 2018-11-16 DIAGNOSIS — J11.00: ICD-10-CM

## 2018-11-16 DIAGNOSIS — R53.81: ICD-10-CM

## 2018-11-16 DIAGNOSIS — E46: ICD-10-CM

## 2018-11-16 DIAGNOSIS — B95.62: ICD-10-CM

## 2018-11-16 DIAGNOSIS — M86.8X7: ICD-10-CM

## 2018-11-16 DIAGNOSIS — D64.9: ICD-10-CM

## 2018-11-16 DIAGNOSIS — J98.11: ICD-10-CM

## 2018-11-16 DIAGNOSIS — I48.91: ICD-10-CM

## 2018-11-16 DIAGNOSIS — I12.0: ICD-10-CM

## 2018-11-16 DIAGNOSIS — N18.6: ICD-10-CM

## 2018-11-16 DIAGNOSIS — H54.8: ICD-10-CM

## 2018-11-16 DIAGNOSIS — E11.22: ICD-10-CM

## 2018-11-16 DIAGNOSIS — E83.39: ICD-10-CM

## 2018-11-16 DIAGNOSIS — T82.7XXA: Primary | ICD-10-CM

## 2018-11-16 DIAGNOSIS — J96.01: ICD-10-CM

## 2018-11-16 DIAGNOSIS — J44.0: ICD-10-CM

## 2018-11-16 DIAGNOSIS — Z72.0: ICD-10-CM

## 2018-11-16 DIAGNOSIS — E87.2: ICD-10-CM

## 2018-11-16 DIAGNOSIS — M25.471: ICD-10-CM

## 2018-11-16 DIAGNOSIS — J44.1: ICD-10-CM

## 2018-11-16 DIAGNOSIS — E11.21: ICD-10-CM

## 2018-11-16 LAB
ALBUMIN SERPL-MCNC: 2.6 G/DL (ref 3.4–5)
ALP SERPL-CCNC: 114 U/L (ref 46–116)
ALT SERPL-CCNC: 17 U/L (ref 10–68)
ANION GAP SERPL CALC-SCNC: 12.9 MMOL/L (ref 8–16)
BASOPHILS NFR BLD AUTO: 0.1 % (ref 0–2)
BILIRUB SERPL-MCNC: 0.56 MG/DL (ref 0.2–1.3)
BUN SERPL-MCNC: 49 MG/DL (ref 7–18)
CALCIUM SERPL-MCNC: 8.7 MG/DL (ref 8.5–10.1)
CHLORIDE SERPL-SCNC: 92 MMOL/L (ref 98–107)
CO2 SERPL-SCNC: 26.1 MMOL/L (ref 21–32)
CREAT SERPL-MCNC: 5.7 MG/DL (ref 0.6–1.3)
EOSINOPHIL NFR BLD: 0 % (ref 0–7)
ERYTHROCYTE [DISTWIDTH] IN BLOOD BY AUTOMATED COUNT: 13.6 % (ref 11.5–14.5)
GLOBULIN SER-MCNC: 4.7 G/L
GLUCOSE SERPL-MCNC: 343 MG/DL (ref 74–106)
HCT VFR BLD CALC: 32.1 % (ref 36–48)
HGB BLD-MCNC: 10.8 G/DL (ref 12–16)
IMM GRANULOCYTES NFR BLD: 0.8 % (ref 0–5)
LYMPHOCYTES NFR BLD AUTO: 6 % (ref 15–50)
MCH RBC QN AUTO: 31.7 PG (ref 26–34)
MCHC RBC AUTO-ENTMCNC: 33.6 G/DL (ref 31–37)
MCV RBC: 94.1 FL (ref 80–100)
MONOCYTES NFR BLD: 6.7 % (ref 2–11)
NEUTROPHILS NFR BLD AUTO: 86.4 % (ref 40–80)
OSMOLALITY SERPL CALC.SUM OF ELEC: 281 MOSM/KG (ref 275–300)
PLATELET # BLD: 121 10X3/UL (ref 130–400)
PMV BLD AUTO: 11.4 FL (ref 7.4–10.4)
POTASSIUM SERPL-SCNC: 4 MMOL/L (ref 3.5–5.1)
PROT SERPL-MCNC: 7.3 G/DL (ref 6.4–8.2)
RBC # BLD AUTO: 3.41 10X6/UL (ref 4–5.4)
SODIUM SERPL-SCNC: 127 MMOL/L (ref 136–145)
WBC # BLD AUTO: 17.1 10X3/UL (ref 4.8–10.8)

## 2018-11-17 VITALS — SYSTOLIC BLOOD PRESSURE: 110 MMHG | DIASTOLIC BLOOD PRESSURE: 51 MMHG

## 2018-11-17 VITALS — DIASTOLIC BLOOD PRESSURE: 54 MMHG | SYSTOLIC BLOOD PRESSURE: 89 MMHG

## 2018-11-17 VITALS — DIASTOLIC BLOOD PRESSURE: 51 MMHG | SYSTOLIC BLOOD PRESSURE: 78 MMHG

## 2018-11-17 LAB
ANION GAP SERPL CALC-SCNC: 17.1 MMOL/L (ref 8–16)
BASOPHILS NFR BLD AUTO: 0.2 % (ref 0–2)
BUN SERPL-MCNC: 55 MG/DL (ref 7–18)
CALCIUM SERPL-MCNC: 8.4 MG/DL (ref 8.5–10.1)
CHLORIDE SERPL-SCNC: 96 MMOL/L (ref 98–107)
CO2 SERPL-SCNC: 24 MMOL/L (ref 21–32)
CREAT SERPL-MCNC: 6.5 MG/DL (ref 0.6–1.3)
EOSINOPHIL NFR BLD: 0 % (ref 0–7)
ERYTHROCYTE [DISTWIDTH] IN BLOOD BY AUTOMATED COUNT: 13.8 % (ref 11.5–14.5)
GLUCOSE SERPL-MCNC: 139 MG/DL (ref 74–106)
HCT VFR BLD CALC: 32.7 % (ref 36–48)
HGB BLD-MCNC: 11.1 G/DL (ref 12–16)
IMM GRANULOCYTES NFR BLD: 1.8 % (ref 0–5)
LYMPHOCYTES NFR BLD AUTO: 5.3 % (ref 15–50)
MAGNESIUM SERPL-MCNC: 1.4 MG/DL (ref 1.8–2.4)
MCH RBC QN AUTO: 31.9 PG (ref 26–34)
MCHC RBC AUTO-ENTMCNC: 33.9 G/DL (ref 31–37)
MCV RBC: 94 FL (ref 80–100)
MONOCYTES NFR BLD: 7.7 % (ref 2–11)
NEUTROPHILS NFR BLD AUTO: 85 % (ref 40–80)
OSMOLALITY SERPL CALC.SUM OF ELEC: 282 MOSM/KG (ref 275–300)
PHOSPHATE SERPL-MCNC: 4.3 MG/DL (ref 2.5–4.9)
PLATELET # BLD: 122 10X3/UL (ref 130–400)
PMV BLD AUTO: 11.8 FL (ref 7.4–10.4)
POTASSIUM SERPL-SCNC: 4.1 MMOL/L (ref 3.5–5.1)
RBC # BLD AUTO: 3.48 10X6/UL (ref 4–5.4)
SODIUM SERPL-SCNC: 133 MMOL/L (ref 136–145)
WBC # BLD AUTO: 12 10X3/UL (ref 4.8–10.8)

## 2018-11-18 VITALS — DIASTOLIC BLOOD PRESSURE: 76 MMHG | SYSTOLIC BLOOD PRESSURE: 148 MMHG

## 2018-11-18 VITALS — DIASTOLIC BLOOD PRESSURE: 57 MMHG | SYSTOLIC BLOOD PRESSURE: 81 MMHG

## 2018-11-18 VITALS — SYSTOLIC BLOOD PRESSURE: 149 MMHG | DIASTOLIC BLOOD PRESSURE: 84 MMHG

## 2018-11-18 VITALS — SYSTOLIC BLOOD PRESSURE: 145 MMHG | DIASTOLIC BLOOD PRESSURE: 72 MMHG

## 2018-11-18 LAB
ANION GAP SERPL CALC-SCNC: 14.5 MMOL/L (ref 8–16)
BASOPHILS NFR BLD AUTO: 0.1 % (ref 0–2)
BUN SERPL-MCNC: 62 MG/DL (ref 7–18)
CALCIUM SERPL-MCNC: 8.2 MG/DL (ref 8.5–10.1)
CHLORIDE SERPL-SCNC: 94 MMOL/L (ref 98–107)
CO2 SERPL-SCNC: 25.7 MMOL/L (ref 21–32)
CREAT SERPL-MCNC: 7.7 MG/DL (ref 0.6–1.3)
EOSINOPHIL NFR BLD: 0.1 % (ref 0–7)
ERYTHROCYTE [DISTWIDTH] IN BLOOD BY AUTOMATED COUNT: 14.1 % (ref 11.5–14.5)
GLUCOSE SERPL-MCNC: 143 MG/DL (ref 74–106)
HCT VFR BLD CALC: 29.5 % (ref 36–48)
HGB BLD-MCNC: 10.1 G/DL (ref 12–16)
IMM GRANULOCYTES NFR BLD: 0.7 % (ref 0–5)
LYMPHOCYTES NFR BLD AUTO: 6.7 % (ref 15–50)
MCH RBC QN AUTO: 32.1 PG (ref 26–34)
MCHC RBC AUTO-ENTMCNC: 34.2 G/DL (ref 31–37)
MCV RBC: 93.7 FL (ref 80–100)
MONOCYTES NFR BLD: 8.7 % (ref 2–11)
NEUTROPHILS NFR BLD AUTO: 83.7 % (ref 40–80)
OSMOLALITY SERPL CALC.SUM OF ELEC: 280 MOSM/KG (ref 275–300)
PLATELET # BLD: 145 10X3/UL (ref 130–400)
PMV BLD AUTO: 12.4 FL (ref 7.4–10.4)
POTASSIUM SERPL-SCNC: 4.2 MMOL/L (ref 3.5–5.1)
RBC # BLD AUTO: 3.15 10X6/UL (ref 4–5.4)
SODIUM SERPL-SCNC: 130 MMOL/L (ref 136–145)
WBC # BLD AUTO: 13.6 10X3/UL (ref 4.8–10.8)

## 2018-11-19 VITALS — DIASTOLIC BLOOD PRESSURE: 55 MMHG | SYSTOLIC BLOOD PRESSURE: 75 MMHG

## 2018-11-19 VITALS — DIASTOLIC BLOOD PRESSURE: 70 MMHG | SYSTOLIC BLOOD PRESSURE: 126 MMHG

## 2018-11-19 VITALS — DIASTOLIC BLOOD PRESSURE: 72 MMHG | SYSTOLIC BLOOD PRESSURE: 100 MMHG

## 2018-11-19 VITALS — DIASTOLIC BLOOD PRESSURE: 54 MMHG | SYSTOLIC BLOOD PRESSURE: 91 MMHG

## 2018-11-19 VITALS — DIASTOLIC BLOOD PRESSURE: 66 MMHG | SYSTOLIC BLOOD PRESSURE: 130 MMHG

## 2018-11-19 VITALS — DIASTOLIC BLOOD PRESSURE: 61 MMHG | SYSTOLIC BLOOD PRESSURE: 82 MMHG

## 2018-11-19 VITALS — SYSTOLIC BLOOD PRESSURE: 63 MMHG | DIASTOLIC BLOOD PRESSURE: 43 MMHG

## 2018-11-19 VITALS — DIASTOLIC BLOOD PRESSURE: 48 MMHG | SYSTOLIC BLOOD PRESSURE: 79 MMHG

## 2018-11-19 VITALS — DIASTOLIC BLOOD PRESSURE: 49 MMHG | SYSTOLIC BLOOD PRESSURE: 80 MMHG

## 2018-11-19 VITALS — DIASTOLIC BLOOD PRESSURE: 86 MMHG | SYSTOLIC BLOOD PRESSURE: 166 MMHG

## 2018-11-19 VITALS — SYSTOLIC BLOOD PRESSURE: 97 MMHG | DIASTOLIC BLOOD PRESSURE: 80 MMHG

## 2018-11-19 VITALS — DIASTOLIC BLOOD PRESSURE: 63 MMHG | SYSTOLIC BLOOD PRESSURE: 107 MMHG

## 2018-11-19 VITALS — SYSTOLIC BLOOD PRESSURE: 94 MMHG | DIASTOLIC BLOOD PRESSURE: 58 MMHG

## 2018-11-19 VITALS — DIASTOLIC BLOOD PRESSURE: 53 MMHG | SYSTOLIC BLOOD PRESSURE: 87 MMHG

## 2018-11-19 VITALS — SYSTOLIC BLOOD PRESSURE: 80 MMHG | DIASTOLIC BLOOD PRESSURE: 58 MMHG

## 2018-11-19 VITALS — SYSTOLIC BLOOD PRESSURE: 86 MMHG | DIASTOLIC BLOOD PRESSURE: 51 MMHG

## 2018-11-19 VITALS — DIASTOLIC BLOOD PRESSURE: 54 MMHG | SYSTOLIC BLOOD PRESSURE: 88 MMHG

## 2018-11-19 VITALS — SYSTOLIC BLOOD PRESSURE: 101 MMHG | DIASTOLIC BLOOD PRESSURE: 69 MMHG

## 2018-11-19 VITALS — SYSTOLIC BLOOD PRESSURE: 131 MMHG | DIASTOLIC BLOOD PRESSURE: 72 MMHG

## 2018-11-19 VITALS — SYSTOLIC BLOOD PRESSURE: 82 MMHG | DIASTOLIC BLOOD PRESSURE: 48 MMHG

## 2018-11-19 LAB
ANION GAP SERPL CALC-SCNC: 21.2 MMOL/L (ref 8–16)
BASOPHILS NFR BLD AUTO: 0.1 % (ref 0–2)
BUN SERPL-MCNC: 73 MG/DL (ref 7–18)
CALCIUM SERPL-MCNC: 8 MG/DL (ref 8.5–10.1)
CHLORIDE SERPL-SCNC: 89 MMOL/L (ref 98–107)
CO2 SERPL-SCNC: 22.4 MMOL/L (ref 21–32)
CREAT SERPL-MCNC: 8.9 MG/DL (ref 0.6–1.3)
EOSINOPHIL NFR BLD: 0 % (ref 0–7)
ERYTHROCYTE [DISTWIDTH] IN BLOOD BY AUTOMATED COUNT: 13.9 % (ref 11.5–14.5)
GLUCOSE SERPL-MCNC: 146 MG/DL (ref 74–106)
HCT VFR BLD CALC: 29.9 % (ref 36–48)
HGB BLD-MCNC: 10.3 G/DL (ref 12–16)
IMM GRANULOCYTES NFR BLD: 2.4 % (ref 0–5)
LYMPHOCYTES NFR BLD AUTO: 5.8 % (ref 15–50)
MCH RBC QN AUTO: 31.6 PG (ref 26–34)
MCHC RBC AUTO-ENTMCNC: 34.4 G/DL (ref 31–37)
MCV RBC: 91.7 FL (ref 80–100)
MONOCYTES NFR BLD: 9.2 % (ref 2–11)
NEUTROPHILS NFR BLD AUTO: 82.5 % (ref 40–80)
OSMOLALITY SERPL CALC.SUM OF ELEC: 281 MOSM/KG (ref 275–300)
PLATELET # BLD: 157 10X3/UL (ref 130–400)
PMV BLD AUTO: 12.2 FL (ref 7.4–10.4)
POTASSIUM SERPL-SCNC: 4.6 MMOL/L (ref 3.5–5.1)
RBC # BLD AUTO: 3.26 10X6/UL (ref 4–5.4)
SODIUM SERPL-SCNC: 128 MMOL/L (ref 136–145)
VANCOMYCIN SERPL-MCNC: 0 UG/ML (ref 10–20)
WBC # BLD AUTO: 13.6 10X3/UL (ref 4.8–10.8)

## 2018-11-20 VITALS — SYSTOLIC BLOOD PRESSURE: 123 MMHG | DIASTOLIC BLOOD PRESSURE: 59 MMHG

## 2018-11-20 VITALS — DIASTOLIC BLOOD PRESSURE: 56 MMHG | SYSTOLIC BLOOD PRESSURE: 115 MMHG

## 2018-11-20 VITALS — DIASTOLIC BLOOD PRESSURE: 51 MMHG | SYSTOLIC BLOOD PRESSURE: 95 MMHG

## 2018-11-20 VITALS — DIASTOLIC BLOOD PRESSURE: 60 MMHG | SYSTOLIC BLOOD PRESSURE: 115 MMHG

## 2018-11-20 VITALS — DIASTOLIC BLOOD PRESSURE: 60 MMHG | SYSTOLIC BLOOD PRESSURE: 121 MMHG

## 2018-11-20 VITALS — SYSTOLIC BLOOD PRESSURE: 118 MMHG | DIASTOLIC BLOOD PRESSURE: 62 MMHG

## 2018-11-20 VITALS — DIASTOLIC BLOOD PRESSURE: 43 MMHG | SYSTOLIC BLOOD PRESSURE: 84 MMHG

## 2018-11-20 VITALS — DIASTOLIC BLOOD PRESSURE: 60 MMHG | SYSTOLIC BLOOD PRESSURE: 122 MMHG

## 2018-11-20 VITALS — SYSTOLIC BLOOD PRESSURE: 110 MMHG | DIASTOLIC BLOOD PRESSURE: 63 MMHG

## 2018-11-20 VITALS — DIASTOLIC BLOOD PRESSURE: 53 MMHG | SYSTOLIC BLOOD PRESSURE: 102 MMHG

## 2018-11-20 VITALS — DIASTOLIC BLOOD PRESSURE: 56 MMHG | SYSTOLIC BLOOD PRESSURE: 91 MMHG

## 2018-11-20 VITALS — DIASTOLIC BLOOD PRESSURE: 59 MMHG | SYSTOLIC BLOOD PRESSURE: 110 MMHG

## 2018-11-20 VITALS — DIASTOLIC BLOOD PRESSURE: 54 MMHG | SYSTOLIC BLOOD PRESSURE: 89 MMHG

## 2018-11-20 VITALS — SYSTOLIC BLOOD PRESSURE: 112 MMHG | DIASTOLIC BLOOD PRESSURE: 63 MMHG

## 2018-11-20 VITALS — SYSTOLIC BLOOD PRESSURE: 103 MMHG | DIASTOLIC BLOOD PRESSURE: 64 MMHG

## 2018-11-20 VITALS — SYSTOLIC BLOOD PRESSURE: 102 MMHG | DIASTOLIC BLOOD PRESSURE: 56 MMHG

## 2018-11-20 VITALS — DIASTOLIC BLOOD PRESSURE: 60 MMHG | SYSTOLIC BLOOD PRESSURE: 93 MMHG

## 2018-11-20 VITALS — DIASTOLIC BLOOD PRESSURE: 63 MMHG | SYSTOLIC BLOOD PRESSURE: 116 MMHG

## 2018-11-20 VITALS — SYSTOLIC BLOOD PRESSURE: 99 MMHG | DIASTOLIC BLOOD PRESSURE: 56 MMHG

## 2018-11-20 VITALS — SYSTOLIC BLOOD PRESSURE: 113 MMHG | DIASTOLIC BLOOD PRESSURE: 60 MMHG

## 2018-11-20 VITALS — DIASTOLIC BLOOD PRESSURE: 53 MMHG | SYSTOLIC BLOOD PRESSURE: 108 MMHG

## 2018-11-20 VITALS — DIASTOLIC BLOOD PRESSURE: 58 MMHG | SYSTOLIC BLOOD PRESSURE: 111 MMHG

## 2018-11-20 VITALS — SYSTOLIC BLOOD PRESSURE: 102 MMHG | DIASTOLIC BLOOD PRESSURE: 62 MMHG

## 2018-11-20 VITALS — DIASTOLIC BLOOD PRESSURE: 55 MMHG | SYSTOLIC BLOOD PRESSURE: 97 MMHG

## 2018-11-20 LAB
ANION GAP SERPL CALC-SCNC: 18.7 MMOL/L (ref 8–16)
BASOPHILS NFR BLD AUTO: 0.2 % (ref 0–2)
BUN SERPL-MCNC: 58 MG/DL (ref 7–18)
CALCIUM SERPL-MCNC: 8 MG/DL (ref 8.5–10.1)
CHLORIDE SERPL-SCNC: 92 MMOL/L (ref 98–107)
CO2 SERPL-SCNC: 23.6 MMOL/L (ref 21–32)
CREAT SERPL-MCNC: 6.7 MG/DL (ref 0.6–1.3)
EOSINOPHIL NFR BLD: 0.1 % (ref 0–7)
ERYTHROCYTE [DISTWIDTH] IN BLOOD BY AUTOMATED COUNT: 14 % (ref 11.5–14.5)
GLUCOSE SERPL-MCNC: 183 MG/DL (ref 74–106)
HCT VFR BLD CALC: 26.4 % (ref 36–48)
HGB BLD-MCNC: 9.2 G/DL (ref 12–16)
IMM GRANULOCYTES NFR BLD: 1 % (ref 0–5)
LYMPHOCYTES NFR BLD AUTO: 5.7 % (ref 15–50)
MACROPHAGES NFR FLD MANUAL: 1 %
MAGNESIUM SERPL-MCNC: 2.6 MG/DL (ref 1.8–2.4)
MCH RBC QN AUTO: 31.9 PG (ref 26–34)
MCHC RBC AUTO-ENTMCNC: 34.8 G/DL (ref 31–37)
MCV RBC: 91.7 FL (ref 80–100)
MONOCYTES NFR BLD: 14.8 % (ref 2–11)
NEUTROPHILS NFR BLD AUTO: 78.2 % (ref 40–80)
NEUTROPHILS NFR FLD MANUAL: 92 %
OSMOLALITY SERPL CALC.SUM OF ELEC: 281 MOSM/KG (ref 275–300)
PHOSPHATE SERPL-MCNC: 5.6 MG/DL (ref 2.5–4.9)
PLATELET # BLD: 152 10X3/UL (ref 130–400)
PMV BLD AUTO: 11.2 FL (ref 7.4–10.4)
POTASSIUM SERPL-SCNC: 4.3 MMOL/L (ref 3.5–5.1)
RBC # BLD AUTO: 2.88 10X6/UL (ref 4–5.4)
SODIUM SERPL-SCNC: 130 MMOL/L (ref 136–145)
VANCOMYCIN SERPL-MCNC: 0 UG/ML (ref 10–20)
WBC # BLD AUTO: 12.1 10X3/UL (ref 4.8–10.8)

## 2018-11-20 PROCEDURE — 0Y6H0Z3 DETACHMENT AT RIGHT LOWER LEG, LOW, OPEN APPROACH: ICD-10-PCS | Performed by: SURGERY

## 2018-11-20 PROCEDURE — 05HM33Z INSERTION OF INFUSION DEVICE INTO RIGHT INTERNAL JUGULAR VEIN, PERCUTANEOUS APPROACH: ICD-10-PCS | Performed by: SURGERY

## 2018-11-20 PROCEDURE — 0S9G3ZX DRAINAGE OF LEFT ANKLE JOINT, PERCUTANEOUS APPROACH, DIAGNOSTIC: ICD-10-PCS | Performed by: ORTHOPAEDIC SURGERY

## 2018-11-20 PROCEDURE — 05PY33Z REMOVAL OF INFUSION DEVICE FROM UPPER VEIN, PERCUTANEOUS APPROACH: ICD-10-PCS | Performed by: SURGERY

## 2018-11-21 VITALS — DIASTOLIC BLOOD PRESSURE: 54 MMHG | SYSTOLIC BLOOD PRESSURE: 102 MMHG

## 2018-11-21 VITALS — SYSTOLIC BLOOD PRESSURE: 94 MMHG | DIASTOLIC BLOOD PRESSURE: 50 MMHG

## 2018-11-21 VITALS — DIASTOLIC BLOOD PRESSURE: 51 MMHG | SYSTOLIC BLOOD PRESSURE: 82 MMHG

## 2018-11-21 VITALS — DIASTOLIC BLOOD PRESSURE: 65 MMHG | SYSTOLIC BLOOD PRESSURE: 95 MMHG

## 2018-11-21 VITALS — DIASTOLIC BLOOD PRESSURE: 58 MMHG | SYSTOLIC BLOOD PRESSURE: 96 MMHG

## 2018-11-21 VITALS — DIASTOLIC BLOOD PRESSURE: 56 MMHG | SYSTOLIC BLOOD PRESSURE: 107 MMHG

## 2018-11-21 VITALS — DIASTOLIC BLOOD PRESSURE: 57 MMHG | SYSTOLIC BLOOD PRESSURE: 95 MMHG

## 2018-11-21 VITALS — SYSTOLIC BLOOD PRESSURE: 114 MMHG | DIASTOLIC BLOOD PRESSURE: 59 MMHG

## 2018-11-21 VITALS — DIASTOLIC BLOOD PRESSURE: 56 MMHG | SYSTOLIC BLOOD PRESSURE: 104 MMHG

## 2018-11-21 VITALS — DIASTOLIC BLOOD PRESSURE: 55 MMHG | SYSTOLIC BLOOD PRESSURE: 86 MMHG

## 2018-11-21 VITALS — SYSTOLIC BLOOD PRESSURE: 87 MMHG | DIASTOLIC BLOOD PRESSURE: 59 MMHG

## 2018-11-21 VITALS — DIASTOLIC BLOOD PRESSURE: 51 MMHG | SYSTOLIC BLOOD PRESSURE: 93 MMHG

## 2018-11-21 VITALS — SYSTOLIC BLOOD PRESSURE: 96 MMHG | DIASTOLIC BLOOD PRESSURE: 57 MMHG

## 2018-11-21 LAB
ALBUMIN SERPL-MCNC: 1.6 G/DL (ref 3.4–5)
ALP SERPL-CCNC: 254 U/L (ref 46–116)
ALT SERPL-CCNC: 57 U/L (ref 10–68)
ANION GAP SERPL CALC-SCNC: 19 MMOL/L (ref 8–16)
BASOPHILS NFR BLD AUTO: 0.2 % (ref 0–2)
BILIRUB SERPL-MCNC: 0.62 MG/DL (ref 0.2–1.3)
BUN SERPL-MCNC: 80 MG/DL (ref 7–18)
CALCIUM SERPL-MCNC: 8 MG/DL (ref 8.5–10.1)
CHLORIDE SERPL-SCNC: 95 MMOL/L (ref 98–107)
CO2 SERPL-SCNC: 21.6 MMOL/L (ref 21–32)
CREAT SERPL-MCNC: 7.7 MG/DL (ref 0.6–1.3)
EOSINOPHIL NFR BLD: 0 % (ref 0–7)
ERYTHROCYTE [DISTWIDTH] IN BLOOD BY AUTOMATED COUNT: 14.1 % (ref 11.5–14.5)
GLOBULIN SER-MCNC: 5 G/L
GLUCOSE SERPL-MCNC: 120 MG/DL (ref 74–106)
HCT VFR BLD CALC: 27.1 % (ref 36–48)
HGB BLD-MCNC: 9.3 G/DL (ref 12–16)
IMM GRANULOCYTES NFR BLD: 3.1 % (ref 0–5)
LYMPHOCYTES NFR BLD AUTO: 8 % (ref 15–50)
MCH RBC QN AUTO: 31.4 PG (ref 26–34)
MCHC RBC AUTO-ENTMCNC: 34.3 G/DL (ref 31–37)
MCV RBC: 91.6 FL (ref 80–100)
MONOCYTES NFR BLD: 12.6 % (ref 2–11)
NEUTROPHILS NFR BLD AUTO: 76.1 % (ref 40–80)
OSMOLALITY SERPL CALC.SUM OF ELEC: 287 MOSM/KG (ref 275–300)
PHOSPHATE SERPL-MCNC: 6.6 MG/DL (ref 2.5–4.9)
PLATELET # BLD: 209 10X3/UL (ref 130–400)
PMV BLD AUTO: 11.1 FL (ref 7.4–10.4)
POTASSIUM SERPL-SCNC: 4.6 MMOL/L (ref 3.5–5.1)
PROT SERPL-MCNC: 6.6 G/DL (ref 6.4–8.2)
RBC # BLD AUTO: 2.96 10X6/UL (ref 4–5.4)
SODIUM SERPL-SCNC: 131 MMOL/L (ref 136–145)
VANCOMYCIN SERPL-MCNC: 15.1 UG/ML (ref 10–20)
WBC # BLD AUTO: 12.3 10X3/UL (ref 4.8–10.8)

## 2018-11-22 VITALS — SYSTOLIC BLOOD PRESSURE: 170 MMHG | DIASTOLIC BLOOD PRESSURE: 65 MMHG

## 2018-11-22 VITALS — SYSTOLIC BLOOD PRESSURE: 101 MMHG | DIASTOLIC BLOOD PRESSURE: 51 MMHG

## 2018-11-22 VITALS — DIASTOLIC BLOOD PRESSURE: 48 MMHG | SYSTOLIC BLOOD PRESSURE: 100 MMHG

## 2018-11-22 VITALS — SYSTOLIC BLOOD PRESSURE: 101 MMHG | DIASTOLIC BLOOD PRESSURE: 44 MMHG

## 2018-11-22 VITALS — DIASTOLIC BLOOD PRESSURE: 56 MMHG | SYSTOLIC BLOOD PRESSURE: 109 MMHG

## 2018-11-22 VITALS — SYSTOLIC BLOOD PRESSURE: 106 MMHG | DIASTOLIC BLOOD PRESSURE: 50 MMHG

## 2018-11-22 LAB
ALBUMIN SERPL-MCNC: 1.5 G/DL (ref 3.4–5)
ALP SERPL-CCNC: 254 U/L (ref 46–116)
ALT SERPL-CCNC: 49 U/L (ref 10–68)
ANION GAP SERPL CALC-SCNC: 20.7 MMOL/L (ref 8–16)
BASOPHILS NFR BLD AUTO: 0.4 % (ref 0–2)
BILIRUB SERPL-MCNC: 0.67 MG/DL (ref 0.2–1.3)
BUN SERPL-MCNC: 47 MG/DL (ref 7–18)
CALCIUM SERPL-MCNC: 8 MG/DL (ref 8.5–10.1)
CHLORIDE SERPL-SCNC: 97 MMOL/L (ref 98–107)
CO2 SERPL-SCNC: 21.3 MMOL/L (ref 21–32)
CREAT SERPL-MCNC: 4.9 MG/DL (ref 0.6–1.3)
EOSINOPHIL NFR BLD: 0.2 % (ref 0–7)
ERYTHROCYTE [DISTWIDTH] IN BLOOD BY AUTOMATED COUNT: 14.4 % (ref 11.5–14.5)
GLOBULIN SER-MCNC: 5.2 G/L
GLUCOSE SERPL-MCNC: 151 MG/DL (ref 74–106)
HCT VFR BLD CALC: 28.1 % (ref 36–48)
HGB BLD-MCNC: 9.7 G/DL (ref 12–16)
IMM GRANULOCYTES NFR BLD: 7.7 % (ref 0–5)
LYMPHOCYTES NFR BLD AUTO: 6.4 % (ref 15–50)
MCH RBC QN AUTO: 31.5 PG (ref 26–34)
MCHC RBC AUTO-ENTMCNC: 34.5 G/DL (ref 31–37)
MCV RBC: 91.2 FL (ref 80–100)
MONOCYTES NFR BLD: 9.9 % (ref 2–11)
NEUTROPHILS NFR BLD AUTO: 75.4 % (ref 40–80)
OSMOLALITY SERPL CALC.SUM OF ELEC: 284 MOSM/KG (ref 275–300)
PHOSPHATE SERPL-MCNC: 6 MG/DL (ref 2.5–4.9)
PLATELET # BLD: 248 10X3/UL (ref 130–400)
PMV BLD AUTO: 10.2 FL (ref 7.4–10.4)
POTASSIUM SERPL-SCNC: 4 MMOL/L (ref 3.5–5.1)
PROT SERPL-MCNC: 6.7 G/DL (ref 6.4–8.2)
RBC # BLD AUTO: 3.08 10X6/UL (ref 4–5.4)
SODIUM SERPL-SCNC: 135 MMOL/L (ref 136–145)
VANCOMYCIN SERPL-MCNC: 19 UG/ML (ref 10–20)
WBC # BLD AUTO: 18.3 10X3/UL (ref 4.8–10.8)

## 2018-11-23 VITALS — DIASTOLIC BLOOD PRESSURE: 50 MMHG | SYSTOLIC BLOOD PRESSURE: 151 MMHG

## 2018-11-23 VITALS — SYSTOLIC BLOOD PRESSURE: 141 MMHG | DIASTOLIC BLOOD PRESSURE: 67 MMHG

## 2018-11-23 VITALS — DIASTOLIC BLOOD PRESSURE: 50 MMHG | SYSTOLIC BLOOD PRESSURE: 148 MMHG

## 2018-11-23 LAB
ANION GAP SERPL CALC-SCNC: 18.7 MMOL/L (ref 8–16)
BUN SERPL-MCNC: 65 MG/DL (ref 7–18)
CALCIUM SERPL-MCNC: 7.9 MG/DL (ref 8.5–10.1)
CHLORIDE SERPL-SCNC: 94 MMOL/L (ref 98–107)
CO2 SERPL-SCNC: 22 MMOL/L (ref 21–32)
CREAT SERPL-MCNC: 6.2 MG/DL (ref 0.6–1.3)
EOSINOPHIL NFR BLD: 1 % (ref 0–7)
ERYTHROCYTE [DISTWIDTH] IN BLOOD BY AUTOMATED COUNT: 14.3 % (ref 11.5–14.5)
GLUCOSE SERPL-MCNC: 102 MG/DL (ref 74–106)
HCT VFR BLD CALC: 26.2 % (ref 36–48)
HGB BLD-MCNC: 9.1 G/DL (ref 12–16)
LYMPHOCYTES NFR BLD AUTO: 9 % (ref 15–50)
MCH RBC QN AUTO: 31.7 PG (ref 26–34)
MCHC RBC AUTO-ENTMCNC: 34.7 G/DL (ref 31–37)
MCV RBC: 91.3 FL (ref 80–100)
MONOCYTES NFR BLD: 3 % (ref 2–11)
NEUTROPHILS NFR BLD AUTO: 78 % (ref 40–80)
OSMOLALITY SERPL CALC.SUM OF ELEC: 281 MOSM/KG (ref 275–300)
PLAT MORPH BLD: (no result)
PLATELET # BLD EST: NORMAL 10*3/UL
PLATELET # BLD: 297 10X3/UL (ref 130–400)
PMV BLD AUTO: 10.4 FL (ref 7.4–10.4)
POTASSIUM SERPL-SCNC: 3.7 MMOL/L (ref 3.5–5.1)
RBC # BLD AUTO: 2.87 10X6/UL (ref 4–5.4)
SODIUM SERPL-SCNC: 131 MMOL/L (ref 136–145)
WBC # BLD AUTO: 23.2 10X3/UL (ref 4.8–10.8)

## 2018-11-24 VITALS — SYSTOLIC BLOOD PRESSURE: 138 MMHG | DIASTOLIC BLOOD PRESSURE: 55 MMHG

## 2018-11-24 VITALS — DIASTOLIC BLOOD PRESSURE: 78 MMHG | SYSTOLIC BLOOD PRESSURE: 150 MMHG

## 2018-11-24 VITALS — DIASTOLIC BLOOD PRESSURE: 65 MMHG | SYSTOLIC BLOOD PRESSURE: 138 MMHG

## 2018-11-24 VITALS — SYSTOLIC BLOOD PRESSURE: 142 MMHG | DIASTOLIC BLOOD PRESSURE: 70 MMHG

## 2018-11-24 VITALS — DIASTOLIC BLOOD PRESSURE: 86 MMHG | SYSTOLIC BLOOD PRESSURE: 156 MMHG

## 2018-11-24 VITALS — SYSTOLIC BLOOD PRESSURE: 156 MMHG | DIASTOLIC BLOOD PRESSURE: 68 MMHG

## 2018-11-24 LAB
ANION GAP SERPL CALC-SCNC: 12.8 MMOL/L (ref 8–16)
BASOPHILS NFR BLD AUTO: 0.3 % (ref 0–2)
BUN SERPL-MCNC: 42 MG/DL (ref 7–18)
CALCIUM SERPL-MCNC: 8.1 MG/DL (ref 8.5–10.1)
CHLORIDE SERPL-SCNC: 96 MMOL/L (ref 98–107)
CO2 SERPL-SCNC: 27 MMOL/L (ref 21–32)
CREAT SERPL-MCNC: 4.5 MG/DL (ref 0.6–1.3)
EOSINOPHIL NFR BLD: 0.6 % (ref 0–7)
ERYTHROCYTE [DISTWIDTH] IN BLOOD BY AUTOMATED COUNT: 14.4 % (ref 11.5–14.5)
GLUCOSE SERPL-MCNC: 75 MG/DL (ref 74–106)
HCT VFR BLD CALC: 27.7 % (ref 36–48)
HGB BLD-MCNC: 9.7 G/DL (ref 12–16)
IMM GRANULOCYTES NFR BLD: 7.3 % (ref 0–5)
LYMPHOCYTES NFR BLD AUTO: 4.5 % (ref 15–50)
MCH RBC QN AUTO: 32.1 PG (ref 26–34)
MCHC RBC AUTO-ENTMCNC: 35 G/DL (ref 31–37)
MCV RBC: 91.7 FL (ref 80–100)
MONOCYTES NFR BLD: 5.7 % (ref 2–11)
NEUTROPHILS NFR BLD AUTO: 81.6 % (ref 40–80)
OSMOLALITY SERPL CALC.SUM OF ELEC: 274 MOSM/KG (ref 275–300)
PLATELET # BLD: 310 10X3/UL (ref 130–400)
PMV BLD AUTO: 10.1 FL (ref 7.4–10.4)
POTASSIUM SERPL-SCNC: 3.8 MMOL/L (ref 3.5–5.1)
RBC # BLD AUTO: 3.02 10X6/UL (ref 4–5.4)
SODIUM SERPL-SCNC: 132 MMOL/L (ref 136–145)
VANCOMYCIN SERPL-MCNC: 26 UG/ML (ref 10–20)
WBC # BLD AUTO: 29.2 10X3/UL (ref 4.8–10.8)

## 2018-11-25 VITALS — DIASTOLIC BLOOD PRESSURE: 57 MMHG | SYSTOLIC BLOOD PRESSURE: 137 MMHG

## 2018-11-25 VITALS — DIASTOLIC BLOOD PRESSURE: 60 MMHG | SYSTOLIC BLOOD PRESSURE: 130 MMHG

## 2018-11-25 VITALS — DIASTOLIC BLOOD PRESSURE: 50 MMHG | SYSTOLIC BLOOD PRESSURE: 148 MMHG

## 2018-11-25 VITALS — SYSTOLIC BLOOD PRESSURE: 142 MMHG | DIASTOLIC BLOOD PRESSURE: 68 MMHG

## 2018-11-25 VITALS — SYSTOLIC BLOOD PRESSURE: 154 MMHG | DIASTOLIC BLOOD PRESSURE: 61 MMHG

## 2018-11-25 VITALS — DIASTOLIC BLOOD PRESSURE: 60 MMHG | SYSTOLIC BLOOD PRESSURE: 136 MMHG

## 2018-11-25 LAB
ANION GAP SERPL CALC-SCNC: 14 MMOL/L (ref 8–16)
BASOPHILS NFR BLD AUTO: 0.1 % (ref 0–2)
BUN SERPL-MCNC: 56 MG/DL (ref 7–18)
CALCIUM SERPL-MCNC: 7.6 MG/DL (ref 8.5–10.1)
CHLORIDE SERPL-SCNC: 95 MMOL/L (ref 98–107)
CO2 SERPL-SCNC: 25.7 MMOL/L (ref 21–32)
CREAT SERPL-MCNC: 5.6 MG/DL (ref 0.6–1.3)
EOSINOPHIL NFR BLD: 2.2 % (ref 0–7)
ERYTHROCYTE [DISTWIDTH] IN BLOOD BY AUTOMATED COUNT: 14.3 % (ref 11.5–14.5)
GLUCOSE SERPL-MCNC: 138 MG/DL (ref 74–106)
HCT VFR BLD CALC: 25.6 % (ref 36–48)
HGB BLD-MCNC: 8.7 G/DL (ref 12–16)
IMM GRANULOCYTES NFR BLD: 4.6 % (ref 0–5)
LYMPHOCYTES NFR BLD AUTO: 5.4 % (ref 15–50)
MCH RBC QN AUTO: 31.6 PG (ref 26–34)
MCHC RBC AUTO-ENTMCNC: 34 G/DL (ref 31–37)
MCV RBC: 93.1 FL (ref 80–100)
MONOCYTES NFR BLD: 5 % (ref 2–11)
NEUTROPHILS NFR BLD AUTO: 82.7 % (ref 40–80)
OSMOLALITY SERPL CALC.SUM OF ELEC: 280 MOSM/KG (ref 275–300)
PLATELET # BLD: 327 10X3/UL (ref 130–400)
PMV BLD AUTO: 10.1 FL (ref 7.4–10.4)
POTASSIUM SERPL-SCNC: 3.7 MMOL/L (ref 3.5–5.1)
RBC # BLD AUTO: 2.75 10X6/UL (ref 4–5.4)
SODIUM SERPL-SCNC: 131 MMOL/L (ref 136–145)
VANCOMYCIN SERPL-MCNC: 21.9 UG/ML (ref 10–20)
WBC # BLD AUTO: 28.1 10X3/UL (ref 4.8–10.8)

## 2018-11-26 VITALS — SYSTOLIC BLOOD PRESSURE: 130 MMHG | DIASTOLIC BLOOD PRESSURE: 68 MMHG

## 2018-11-26 VITALS — SYSTOLIC BLOOD PRESSURE: 142 MMHG | DIASTOLIC BLOOD PRESSURE: 69 MMHG

## 2018-11-26 VITALS — SYSTOLIC BLOOD PRESSURE: 119 MMHG | DIASTOLIC BLOOD PRESSURE: 47 MMHG

## 2018-11-26 VITALS — DIASTOLIC BLOOD PRESSURE: 77 MMHG | SYSTOLIC BLOOD PRESSURE: 165 MMHG

## 2018-11-26 VITALS — SYSTOLIC BLOOD PRESSURE: 122 MMHG | DIASTOLIC BLOOD PRESSURE: 58 MMHG

## 2018-11-26 LAB
ANION GAP SERPL CALC-SCNC: 13.3 MMOL/L (ref 8–16)
BASOPHILS NFR BLD AUTO: 0.3 % (ref 0–2)
BUN SERPL-MCNC: 65 MG/DL (ref 7–18)
CALCIUM SERPL-MCNC: 7.2 MG/DL (ref 8.5–10.1)
CHLORIDE SERPL-SCNC: 95 MMOL/L (ref 98–107)
CO2 SERPL-SCNC: 24.7 MMOL/L (ref 21–32)
CREAT SERPL-MCNC: 6.4 MG/DL (ref 0.6–1.3)
EOSINOPHIL NFR BLD: 2.1 % (ref 0–7)
ERYTHROCYTE [DISTWIDTH] IN BLOOD BY AUTOMATED COUNT: 14.5 % (ref 11.5–14.5)
GLUCOSE SERPL-MCNC: 150 MG/DL (ref 74–106)
HCT VFR BLD CALC: 24.2 % (ref 36–48)
HGB BLD-MCNC: 7.9 G/DL (ref 12–16)
IMM GRANULOCYTES NFR BLD: 4.7 % (ref 0–5)
LYMPHOCYTES NFR BLD AUTO: 8.2 % (ref 15–50)
MCH RBC QN AUTO: 31.3 PG (ref 26–34)
MCHC RBC AUTO-ENTMCNC: 32.6 G/DL (ref 31–37)
MCV RBC: 96 FL (ref 80–100)
MONOCYTES NFR BLD: 7.4 % (ref 2–11)
NEUTROPHILS NFR BLD AUTO: 77.3 % (ref 40–80)
OSMOLALITY SERPL CALC.SUM OF ELEC: 280 MOSM/KG (ref 275–300)
PLATELET # BLD: 314 10X3/UL (ref 130–400)
PMV BLD AUTO: 10 FL (ref 7.4–10.4)
POTASSIUM SERPL-SCNC: 4 MMOL/L (ref 3.5–5.1)
RBC # BLD AUTO: 2.52 10X6/UL (ref 4–5.4)
SODIUM SERPL-SCNC: 129 MMOL/L (ref 136–145)
VANCOMYCIN SERPL-MCNC: 21.3 UG/ML (ref 10–20)
WBC # BLD AUTO: 23.5 10X3/UL (ref 4.8–10.8)

## 2018-11-27 VITALS — SYSTOLIC BLOOD PRESSURE: 150 MMHG | DIASTOLIC BLOOD PRESSURE: 54 MMHG

## 2018-11-27 VITALS — SYSTOLIC BLOOD PRESSURE: 153 MMHG | DIASTOLIC BLOOD PRESSURE: 59 MMHG

## 2018-11-27 VITALS — SYSTOLIC BLOOD PRESSURE: 116 MMHG | DIASTOLIC BLOOD PRESSURE: 81 MMHG

## 2018-11-27 VITALS — DIASTOLIC BLOOD PRESSURE: 53 MMHG | SYSTOLIC BLOOD PRESSURE: 132 MMHG

## 2018-11-27 VITALS — DIASTOLIC BLOOD PRESSURE: 54 MMHG | SYSTOLIC BLOOD PRESSURE: 118 MMHG

## 2018-11-27 LAB
ANION GAP SERPL CALC-SCNC: 14 MMOL/L (ref 8–16)
BASOPHILS NFR BLD AUTO: 0.2 % (ref 0–2)
BUN SERPL-MCNC: 51 MG/DL (ref 7–18)
CALCIUM SERPL-MCNC: 7.3 MG/DL (ref 8.5–10.1)
CHLORIDE SERPL-SCNC: 99 MMOL/L (ref 98–107)
CO2 SERPL-SCNC: 25 MMOL/L (ref 21–32)
CREAT SERPL-MCNC: 5.5 MG/DL (ref 0.6–1.3)
EOSINOPHIL NFR BLD: 1.1 % (ref 0–7)
ERYTHROCYTE [DISTWIDTH] IN BLOOD BY AUTOMATED COUNT: 14.4 % (ref 11.5–14.5)
GLUCOSE SERPL-MCNC: 190 MG/DL (ref 74–106)
HCT VFR BLD CALC: 22.3 % (ref 36–48)
HGB BLD-MCNC: 7.3 G/DL (ref 12–16)
IMM GRANULOCYTES NFR BLD: 3 % (ref 0–5)
LYMPHOCYTES NFR BLD AUTO: 6.7 % (ref 15–50)
MCH RBC QN AUTO: 31.6 PG (ref 26–34)
MCHC RBC AUTO-ENTMCNC: 32.7 G/DL (ref 31–37)
MCV RBC: 96.5 FL (ref 80–100)
MONOCYTES NFR BLD: 6.5 % (ref 2–11)
NEUTROPHILS NFR BLD AUTO: 82.5 % (ref 40–80)
OSMOLALITY SERPL CALC.SUM OF ELEC: 286 MOSM/KG (ref 275–300)
PHOSPHATE SERPL-MCNC: 4 MG/DL (ref 2.5–4.9)
PLATELET # BLD: 372 10X3/UL (ref 130–400)
PMV BLD AUTO: 9.6 FL (ref 7.4–10.4)
POTASSIUM SERPL-SCNC: 4 MMOL/L (ref 3.5–5.1)
RBC # BLD AUTO: 2.31 10X6/UL (ref 4–5.4)
SODIUM SERPL-SCNC: 134 MMOL/L (ref 136–145)
VANCOMYCIN SERPL-MCNC: 18.5 UG/ML (ref 10–20)
WBC # BLD AUTO: 20.4 10X3/UL (ref 4.8–10.8)

## 2018-11-28 VITALS — SYSTOLIC BLOOD PRESSURE: 137 MMHG | DIASTOLIC BLOOD PRESSURE: 67 MMHG

## 2018-11-28 VITALS — DIASTOLIC BLOOD PRESSURE: 58 MMHG | SYSTOLIC BLOOD PRESSURE: 157 MMHG

## 2018-11-28 VITALS — SYSTOLIC BLOOD PRESSURE: 115 MMHG | DIASTOLIC BLOOD PRESSURE: 70 MMHG

## 2018-11-28 VITALS — DIASTOLIC BLOOD PRESSURE: 51 MMHG | SYSTOLIC BLOOD PRESSURE: 102 MMHG

## 2018-11-28 VITALS — SYSTOLIC BLOOD PRESSURE: 141 MMHG | DIASTOLIC BLOOD PRESSURE: 68 MMHG

## 2018-11-28 VITALS — SYSTOLIC BLOOD PRESSURE: 130 MMHG | DIASTOLIC BLOOD PRESSURE: 57 MMHG

## 2018-11-28 LAB
ANION GAP SERPL CALC-SCNC: 14.8 MMOL/L (ref 8–16)
BASOPHILS NFR BLD AUTO: 0.3 % (ref 0–2)
BUN SERPL-MCNC: 57 MG/DL (ref 7–18)
CALCIUM SERPL-MCNC: 7.3 MG/DL (ref 8.5–10.1)
CHLORIDE SERPL-SCNC: 103 MMOL/L (ref 98–107)
CO2 SERPL-SCNC: 24.9 MMOL/L (ref 21–32)
CREAT SERPL-MCNC: 6.2 MG/DL (ref 0.6–1.3)
EOSINOPHIL NFR BLD: 1.2 % (ref 0–7)
ERYTHROCYTE [DISTWIDTH] IN BLOOD BY AUTOMATED COUNT: 14.8 % (ref 11.5–14.5)
GLUCOSE SERPL-MCNC: 74 MG/DL (ref 74–106)
HCT VFR BLD CALC: 22.9 % (ref 36–48)
HGB BLD-MCNC: 7.4 G/DL (ref 12–16)
IMM GRANULOCYTES NFR BLD: 4.4 % (ref 0–5)
LYMPHOCYTES NFR BLD AUTO: 11 % (ref 15–50)
MCH RBC QN AUTO: 31.1 PG (ref 26–34)
MCHC RBC AUTO-ENTMCNC: 32.3 G/DL (ref 31–37)
MCV RBC: 96.2 FL (ref 80–100)
MONOCYTES NFR BLD: 6.3 % (ref 2–11)
NEUTROPHILS NFR BLD AUTO: 76.8 % (ref 40–80)
OSMOLALITY SERPL CALC.SUM OF ELEC: 292 MOSM/KG (ref 275–300)
PHOSPHATE SERPL-MCNC: 4.2 MG/DL (ref 2.5–4.9)
PLATELET # BLD: 447 10X3/UL (ref 130–400)
PMV BLD AUTO: 9.7 FL (ref 7.4–10.4)
POTASSIUM SERPL-SCNC: 3.7 MMOL/L (ref 3.5–5.1)
RBC # BLD AUTO: 2.38 10X6/UL (ref 4–5.4)
SODIUM SERPL-SCNC: 139 MMOL/L (ref 136–145)
VANCOMYCIN SERPL-MCNC: 12.2 UG/ML (ref 10–20)
WBC # BLD AUTO: 21.3 10X3/UL (ref 4.8–10.8)

## 2018-11-29 VITALS — DIASTOLIC BLOOD PRESSURE: 65 MMHG | SYSTOLIC BLOOD PRESSURE: 160 MMHG

## 2018-11-29 VITALS — DIASTOLIC BLOOD PRESSURE: 52 MMHG | SYSTOLIC BLOOD PRESSURE: 171 MMHG

## 2018-11-29 VITALS — SYSTOLIC BLOOD PRESSURE: 166 MMHG | DIASTOLIC BLOOD PRESSURE: 62 MMHG

## 2018-11-29 VITALS — SYSTOLIC BLOOD PRESSURE: 127 MMHG | DIASTOLIC BLOOD PRESSURE: 51 MMHG

## 2018-11-29 VITALS — DIASTOLIC BLOOD PRESSURE: 57 MMHG | SYSTOLIC BLOOD PRESSURE: 141 MMHG

## 2018-11-29 VITALS — SYSTOLIC BLOOD PRESSURE: 170 MMHG | DIASTOLIC BLOOD PRESSURE: 60 MMHG

## 2018-11-29 LAB
%HYPO/RBC NFR BLD AUTO: (no result) %
ANION GAP SERPL CALC-SCNC: 14.1 MMOL/L (ref 8–16)
ANISOCYTOSIS BLD QL SMEAR: (no result)
BASOPHILS NFR BLD AUTO: 1 % (ref 0–2)
BUN SERPL-MCNC: 61 MG/DL (ref 7–18)
CALCIUM SERPL-MCNC: 7.4 MG/DL (ref 8.5–10.1)
CHLORIDE SERPL-SCNC: 102 MMOL/L (ref 98–107)
CO2 SERPL-SCNC: 23.6 MMOL/L (ref 21–32)
CREAT SERPL-MCNC: 5.9 MG/DL (ref 0.6–1.3)
EOSINOPHIL NFR BLD: 1 % (ref 0–7)
ERYTHROCYTE [DISTWIDTH] IN BLOOD BY AUTOMATED COUNT: 15.2 % (ref 11.5–14.5)
GLUCOSE SERPL-MCNC: 285 MG/DL (ref 74–106)
HCT VFR BLD CALC: 23.8 % (ref 36–48)
HGB BLD-MCNC: 7.8 G/DL (ref 12–16)
LYMPHOCYTES NFR BLD AUTO: 12 % (ref 15–50)
MCH RBC QN AUTO: 31.6 PG (ref 26–34)
MCHC RBC AUTO-ENTMCNC: 32.8 G/DL (ref 31–37)
MCV RBC: 96.4 FL (ref 80–100)
MONOCYTES NFR BLD: 16 % (ref 2–11)
NEUTROPHILS NFR BLD AUTO: 66 % (ref 40–80)
OSMOLALITY SERPL CALC.SUM OF ELEC: 298 MOSM/KG (ref 275–300)
PHOSPHATE SERPL-MCNC: 4.4 MG/DL (ref 2.5–4.9)
PLATELET # BLD EST: (no result) 10*3/UL
PLATELET # BLD: 443 10X3/UL (ref 130–400)
PMV BLD AUTO: 9.6 FL (ref 7.4–10.4)
POTASSIUM SERPL-SCNC: 3.7 MMOL/L (ref 3.5–5.1)
RBC # BLD AUTO: 2.47 10X6/UL (ref 4–5.4)
SODIUM SERPL-SCNC: 136 MMOL/L (ref 136–145)
VANCOMYCIN SERPL-MCNC: 22.7 UG/ML (ref 10–20)
WBC # BLD AUTO: 21.1 10X3/UL (ref 4.8–10.8)

## 2018-11-30 VITALS — DIASTOLIC BLOOD PRESSURE: 57 MMHG | SYSTOLIC BLOOD PRESSURE: 138 MMHG

## 2018-11-30 VITALS — SYSTOLIC BLOOD PRESSURE: 148 MMHG | DIASTOLIC BLOOD PRESSURE: 52 MMHG

## 2018-11-30 VITALS — DIASTOLIC BLOOD PRESSURE: 51 MMHG | SYSTOLIC BLOOD PRESSURE: 155 MMHG

## 2018-11-30 LAB
ANION GAP SERPL CALC-SCNC: 16.7 MMOL/L (ref 8–16)
BASOPHILS NFR BLD AUTO: 0.2 % (ref 0–2)
BUN SERPL-MCNC: 73 MG/DL (ref 7–18)
CALCIUM SERPL-MCNC: 6.9 MG/DL (ref 8.5–10.1)
CHLORIDE SERPL-SCNC: 101 MMOL/L (ref 98–107)
CO2 SERPL-SCNC: 22 MMOL/L (ref 21–32)
CREAT SERPL-MCNC: 6.4 MG/DL (ref 0.6–1.3)
EOSINOPHIL NFR BLD: 1.4 % (ref 0–7)
ERYTHROCYTE [DISTWIDTH] IN BLOOD BY AUTOMATED COUNT: 15.9 % (ref 11.5–14.5)
GLUCOSE SERPL-MCNC: 204 MG/DL (ref 74–106)
HCT VFR BLD CALC: 21.7 % (ref 36–48)
HGB BLD-MCNC: 7.1 G/DL (ref 12–16)
IMM GRANULOCYTES NFR BLD: 3.1 % (ref 0–5)
LYMPHOCYTES NFR BLD AUTO: 13.3 % (ref 15–50)
MCH RBC QN AUTO: 31.6 PG (ref 26–34)
MCHC RBC AUTO-ENTMCNC: 32.7 G/DL (ref 31–37)
MCV RBC: 96.4 FL (ref 80–100)
MONOCYTES NFR BLD: 7.3 % (ref 2–11)
NEUTROPHILS NFR BLD AUTO: 74.7 % (ref 40–80)
OSMOLALITY SERPL CALC.SUM OF ELEC: 297 MOSM/KG (ref 275–300)
PHOSPHATE SERPL-MCNC: 4.1 MG/DL (ref 2.5–4.9)
PLATELET # BLD: 479 10X3/UL (ref 130–400)
PMV BLD AUTO: 9.4 FL (ref 7.4–10.4)
POTASSIUM SERPL-SCNC: 4.7 MMOL/L (ref 3.5–5.1)
RBC # BLD AUTO: 2.25 10X6/UL (ref 4–5.4)
SODIUM SERPL-SCNC: 135 MMOL/L (ref 136–145)
VANCOMYCIN SERPL-MCNC: 21.3 UG/ML (ref 10–20)
WBC # BLD AUTO: 19.1 10X3/UL (ref 4.8–10.8)

## 2018-12-01 VITALS — SYSTOLIC BLOOD PRESSURE: 167 MMHG | DIASTOLIC BLOOD PRESSURE: 71 MMHG

## 2018-12-01 VITALS — SYSTOLIC BLOOD PRESSURE: 140 MMHG | DIASTOLIC BLOOD PRESSURE: 58 MMHG

## 2018-12-01 VITALS — SYSTOLIC BLOOD PRESSURE: 140 MMHG | DIASTOLIC BLOOD PRESSURE: 76 MMHG

## 2018-12-01 LAB
ANION GAP SERPL CALC-SCNC: 14.1 MMOL/L (ref 8–16)
BASOPHILS NFR BLD AUTO: 0.3 % (ref 0–2)
BUN SERPL-MCNC: 68 MG/DL (ref 7–18)
CALCIUM SERPL-MCNC: 7.1 MG/DL (ref 8.5–10.1)
CHLORIDE SERPL-SCNC: 102 MMOL/L (ref 98–107)
CO2 SERPL-SCNC: 25.2 MMOL/L (ref 21–32)
CREAT SERPL-MCNC: 5.6 MG/DL (ref 0.6–1.3)
EOSINOPHIL NFR BLD: 1.2 % (ref 0–7)
ERYTHROCYTE [DISTWIDTH] IN BLOOD BY AUTOMATED COUNT: 15.7 % (ref 11.5–14.5)
GLUCOSE SERPL-MCNC: 198 MG/DL (ref 74–106)
HCT VFR BLD CALC: 22.2 % (ref 36–48)
HGB BLD-MCNC: 7.3 G/DL (ref 12–16)
IMM GRANULOCYTES NFR BLD: 1.7 % (ref 0–5)
LYMPHOCYTES NFR BLD AUTO: 11.7 % (ref 15–50)
MCH RBC QN AUTO: 31.5 PG (ref 26–34)
MCHC RBC AUTO-ENTMCNC: 32.9 G/DL (ref 31–37)
MCV RBC: 95.7 FL (ref 80–100)
MONOCYTES NFR BLD: 6.4 % (ref 2–11)
NEUTROPHILS NFR BLD AUTO: 78.7 % (ref 40–80)
OSMOLALITY SERPL CALC.SUM OF ELEC: 299 MOSM/KG (ref 275–300)
PHOSPHATE SERPL-MCNC: 3.6 MG/DL (ref 2.5–4.9)
PLATELET # BLD: 416 10X3/UL (ref 130–400)
PMV BLD AUTO: 9.4 FL (ref 7.4–10.4)
POTASSIUM SERPL-SCNC: 4.3 MMOL/L (ref 3.5–5.1)
RBC # BLD AUTO: 2.32 10X6/UL (ref 4–5.4)
SODIUM SERPL-SCNC: 137 MMOL/L (ref 136–145)
VANCOMYCIN SERPL-MCNC: 18.8 UG/ML (ref 10–20)
WBC # BLD AUTO: 17.2 10X3/UL (ref 4.8–10.8)

## 2018-12-02 VITALS — DIASTOLIC BLOOD PRESSURE: 75 MMHG | SYSTOLIC BLOOD PRESSURE: 177 MMHG

## 2018-12-02 VITALS — DIASTOLIC BLOOD PRESSURE: 67 MMHG | SYSTOLIC BLOOD PRESSURE: 171 MMHG

## 2018-12-02 VITALS — DIASTOLIC BLOOD PRESSURE: 63 MMHG | SYSTOLIC BLOOD PRESSURE: 133 MMHG

## 2018-12-02 VITALS — DIASTOLIC BLOOD PRESSURE: 85 MMHG | SYSTOLIC BLOOD PRESSURE: 169 MMHG

## 2019-09-25 ENCOUNTER — HOSPITAL ENCOUNTER (INPATIENT)
Dept: HOSPITAL 84 - D.ER | Age: 54
LOS: 1 days | Discharge: LEFT BEFORE DISCHARGE | DRG: 314 | End: 2019-09-26
Attending: INTERNAL MEDICINE | Admitting: INTERNAL MEDICINE
Payer: MEDICARE

## 2019-09-25 VITALS — HEIGHT: 65 IN | WEIGHT: 111.3 LBS | BODY MASS INDEX: 18.54 KG/M2 | BODY MASS INDEX: 18.54 KG/M2

## 2019-09-25 DIAGNOSIS — E11.22: ICD-10-CM

## 2019-09-25 DIAGNOSIS — Z99.2: ICD-10-CM

## 2019-09-25 DIAGNOSIS — N18.6: ICD-10-CM

## 2019-09-25 DIAGNOSIS — I12.0: ICD-10-CM

## 2019-09-25 DIAGNOSIS — F17.210: ICD-10-CM

## 2019-09-25 DIAGNOSIS — J43.9: ICD-10-CM

## 2019-09-25 DIAGNOSIS — T82.42XA: Primary | ICD-10-CM
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
## 2019-09-26 VITALS — DIASTOLIC BLOOD PRESSURE: 79 MMHG | SYSTOLIC BLOOD PRESSURE: 139 MMHG

## 2019-09-26 VITALS — DIASTOLIC BLOOD PRESSURE: 86 MMHG | SYSTOLIC BLOOD PRESSURE: 132 MMHG

## 2019-09-26 VITALS — DIASTOLIC BLOOD PRESSURE: 88 MMHG | SYSTOLIC BLOOD PRESSURE: 146 MMHG

## 2019-09-26 VITALS — SYSTOLIC BLOOD PRESSURE: 132 MMHG | DIASTOLIC BLOOD PRESSURE: 80 MMHG

## 2019-09-26 LAB
ANION GAP SERPL CALC-SCNC: 14.3 MMOL/L (ref 8–16)
BASOPHILS NFR BLD AUTO: 0.6 % (ref 0–2)
BUN SERPL-MCNC: 43 MG/DL (ref 7–18)
CALCIUM SERPL-MCNC: 8.6 MG/DL (ref 8.5–10.1)
CHLORIDE SERPL-SCNC: 103 MMOL/L (ref 98–107)
CO2 SERPL-SCNC: 28.1 MMOL/L (ref 21–32)
CREAT SERPL-MCNC: 4.3 MG/DL (ref 0.6–1.3)
EOSINOPHIL NFR BLD: 5.8 % (ref 0–7)
ERYTHROCYTE [DISTWIDTH] IN BLOOD BY AUTOMATED COUNT: 17.2 % (ref 11.5–14.5)
GLUCOSE SERPL-MCNC: 109 MG/DL (ref 74–106)
HCT VFR BLD CALC: 33 % (ref 36–48)
HGB BLD-MCNC: 10.8 G/DL (ref 12–16)
IMM GRANULOCYTES NFR BLD: 0.2 % (ref 0–5)
INR PPP: 1.07 (ref 0.85–1.17)
LYMPHOCYTES NFR BLD AUTO: 32.2 % (ref 15–50)
MCH RBC QN AUTO: 30.8 PG (ref 26–34)
MCHC RBC AUTO-ENTMCNC: 32.7 G/DL (ref 31–37)
MCV RBC: 94 FL (ref 80–100)
MONOCYTES NFR BLD: 13.4 % (ref 2–11)
NEUTROPHILS NFR BLD AUTO: 47.8 % (ref 40–80)
OSMOLALITY SERPL CALC.SUM OF ELEC: 292 MOSM/KG (ref 275–300)
PLATELET # BLD: 294 10X3/UL (ref 130–400)
PMV BLD AUTO: 9.6 FL (ref 7.4–10.4)
POTASSIUM SERPL-SCNC: 4.4 MMOL/L (ref 3.5–5.1)
PROTHROMBIN TIME: 13.4 SECONDS (ref 11.6–15)
RBC # BLD AUTO: 3.51 10X6/UL (ref 4–5.4)
SODIUM SERPL-SCNC: 141 MMOL/L (ref 136–145)
WBC # BLD AUTO: 4.8 10X3/UL (ref 4.8–10.8)

## 2019-09-26 PROCEDURE — B54CZZA ULTRASONOGRAPHY OF LEFT LOWER EXTREMITY VEINS, GUIDANCE: ICD-10-PCS | Performed by: SURGERY

## 2019-09-26 PROCEDURE — 06HN33Z INSERTION OF INFUSION DEVICE INTO LEFT FEMORAL VEIN, PERCUTANEOUS APPROACH: ICD-10-PCS | Performed by: SURGERY

## 2019-09-26 NOTE — NUR
WENT TO TAKE THE PATIENT AN ULTRAM AND SHE IS ON HER CELL PHONE. SHE STATES,
"I DON'T TAKE ULTRAM, I TAKE HYDROCONE 10". I RETURNED THE ULTRAM AND PLACED
CALL TO [facility] MORALEZ AGAIN.
 
[id number]-LENNOX TO CALL BACK WITH NEW ORDERS.

## 2019-09-26 NOTE — NUR
PT RECIEVED VIA STRETCHER FROM ER, A/OX4, VOICES NEEDS, RIGHT FOREARM PIV
INTACT AND SALINE LOCKED, ASSESSMENT COMPLETED, PT BLIND, LISTENING TO TV WITH
NO C/O

## 2019-09-30 NOTE — OP
PATIENT NAME:  DORINDA AYALA                              MEDICAL RECORD: [id number]
:61                                             LOCATION:D.     D.2102
                                                         ADMISSION DATE:19
SURGEON:  JOVANA OSORIO MD            
 
 
DATE OF OPERATION:  2019
 
REFERRING PHYSICIAN:  Hill Douglas MD
 
PREOPERATIVE DIAGNOSES:  End-stage renal disease, dependence on hemodialysis and
displaced vascular dialysis catheter.
 
POSTOPERATIVE DIAGNOSES:  End-stage renal disease, dependence on hemodialysis
and displaced vascular dialysis catheter.
 
OPERATION PERFORMED:  Ultrasound-guided insertion of a left common femoral
tunneled 35 cm HemoSplit dialysis catheter.
 
SURGEON:  Jovana Osorio MD
 
ANESTHESIA:  General with LMA per CRNA.
 
PREOPERATIVE NOTE:  Ms. Ayala is a 53-year-old -American female who has
had numerous prior dialysis catheters and pulled her most recent one out
yesterday accidentally I suppose.  She does not have a long-term access.  She
has had numerous numerous catheters.  She is brought to the OR at this time to
put in another one and I hope she will be able to have a long-term dialysis
access procedure soon.  Note, she has a history of MRSA infection.
 
DESCRIPTION OF PROCEDURE:  Under anesthesia in supine position, the patient was
prepped and draped in a sterile manner.  I examined the right internal jugular
vein and the left internal jugular vein with the duplex ultrasound device and
noted both internal jugular veins to be sclerotic and essentially unusable.  I
then examined the right common femoral vein with ultrasound and noted it to be
somewhat fuzzy or blurred in appearance consistent with prior phlebitis.  I
examined the left common femoral vein and found it to be more suitable though at
least it was fully compressible and had a sharper margin than on the right.  I
made a small incision over it and with continuous ultrasound guidance, I
inserted a needle and a 0.018 guidewire.  Hard copy ultrasound images were
obtained for permanent record in the patient's chart.
 
Catheter and wire exchanges were made and lastly a dilator peel-away introducer
sheath was placed.  I chose a 35 cm HemoSplit and brought it through an inferior
stab incision through a subcutaneous tunnel up to the primary incision where it
was then inserted through the peel-away sheath and that was removed.  I
positioned the catheter in the upper inferior vena cava.  This required a little
guidewire manipulation to get the catheter crossed the bifurcation or iliac vein
confluence.  Both lumens were then accessed and aspirated, free return of blood
confirmed.  They were flushed with saline and then heparin lock solution,
clamped, and capped.  Catheter was sutured to the skin with 2-0 Prolene.  The
skin incision in the groin closed with interrupted inverted 3-0 Vicryl and
Dermabond glue and dressed with Maxorb Ag, Tegaderm, and Cavilon skin prep and a
standard CVL dressing applied to the catheter at the exit site.  The patient was
awakened and taken to the recovery room.  There was no blood loss of consequence
during the procedure and all sponges, instruments, and needles were accounted
for.  No surgical specimen was submitted.
 
 
 
 
OPERATIVE REPORT                               [id number]    DORINDA AYALA            
 
 
PLAN:  The patient can dialyze today or tomorrow.  She, I think rather
desperately needs a long-term dialysis access though it is not emergent.  My
operating schedule tomorrow was rather full.  Rather than taking her back to the
operating room tomorrow, I would prefer that she have bilateral upper extremity
venograms and if possible either CT or MR imaging of the central veins or
thoracic veins for evaluation of the best or most likely successful upper
extremity dialysis access placement.  She can go home then probably over the
weekend.  I will be gone out of town next week, but I will see her back in the
office soon after I returned and after that she will be scheduled for a dialysis
access procedure, hopefully, an upper extremity Artegraft.
 
TRANSINT:[id number] Voice Confirmation ID: [id number] DOCUMENT ID: [id number]
                                           
                                           JOVANA OSORIO MD            
 
 
 
Electronically Signed by JOVANA OSORIO on 19 at 1554
 
 
 
 
 
 
 
 
 
 
 
 
 
 
 
 
 
 
 
 
 
 
 
 
 
 
 
 
 
CC: HILL DOUGLAS                                            [id number]
DICTATION DATE: 19 1525     :     194      DIS IN  
                                                                      19
[facility]                                          
[address] [place] [address]

## 2019-11-19 ENCOUNTER — HOSPITAL ENCOUNTER (INPATIENT)
Dept: HOSPITAL 84 - D.M2 | Age: 54
LOS: 7 days | Discharge: NURSING, REHAB OR LONG-TERM CARE | DRG: 252 | End: 2019-11-26
Attending: INTERNAL MEDICINE | Admitting: INTERNAL MEDICINE
Payer: MEDICARE

## 2019-11-19 VITALS
WEIGHT: 119 LBS | BODY MASS INDEX: 21.09 KG/M2 | BODY MASS INDEX: 21.09 KG/M2 | HEIGHT: 63 IN | BODY MASS INDEX: 21.09 KG/M2 | HEIGHT: 63 IN | BODY MASS INDEX: 21.09 KG/M2 | BODY MASS INDEX: 21.09 KG/M2 | WEIGHT: 119 LBS

## 2019-11-19 VITALS — SYSTOLIC BLOOD PRESSURE: 118 MMHG | DIASTOLIC BLOOD PRESSURE: 60 MMHG

## 2019-11-19 VITALS — SYSTOLIC BLOOD PRESSURE: 111 MMHG | DIASTOLIC BLOOD PRESSURE: 59 MMHG

## 2019-11-19 VITALS — SYSTOLIC BLOOD PRESSURE: 98 MMHG | DIASTOLIC BLOOD PRESSURE: 71 MMHG

## 2019-11-19 DIAGNOSIS — I13.2: ICD-10-CM

## 2019-11-19 DIAGNOSIS — I48.0: ICD-10-CM

## 2019-11-19 DIAGNOSIS — Z99.2: ICD-10-CM

## 2019-11-19 DIAGNOSIS — I50.9: ICD-10-CM

## 2019-11-19 DIAGNOSIS — Z89.512: ICD-10-CM

## 2019-11-19 DIAGNOSIS — E11.9: ICD-10-CM

## 2019-11-19 DIAGNOSIS — J44.9: ICD-10-CM

## 2019-11-19 DIAGNOSIS — Z72.0: ICD-10-CM

## 2019-11-19 DIAGNOSIS — N18.6: ICD-10-CM

## 2019-11-19 DIAGNOSIS — I47.2: ICD-10-CM
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
## 2019-11-19 DIAGNOSIS — E11.51: ICD-10-CM

## 2019-11-19 DIAGNOSIS — T82.858A: Primary | ICD-10-CM

## 2019-11-19 DIAGNOSIS — E11.22: ICD-10-CM

## 2019-11-19 LAB
ALBUMIN SERPL-MCNC: 2.8 G/DL (ref 3.4–5)
ALP SERPL-CCNC: 99 U/L (ref 46–116)
ALT SERPL-CCNC: 18 U/L (ref 10–68)
ANION GAP SERPL CALC-SCNC: 13.5 MMOL/L (ref 8–16)
ANION GAP SERPL CALC-SCNC: 15.9 MMOL/L (ref 8–16)
BASOPHILS NFR BLD AUTO: 1 % (ref 0–2)
BASOPHILS NFR BLD AUTO: 1.2 % (ref 0–2)
BILIRUB SERPL-MCNC: 0.24 MG/DL (ref 0.2–1.3)
BUN SERPL-MCNC: 39 MG/DL (ref 7–18)
BUN SERPL-MCNC: 41 MG/DL (ref 7–18)
CALCIUM SERPL-MCNC: 8 MG/DL (ref 8.5–10.1)
CALCIUM SERPL-MCNC: 8.2 MG/DL (ref 8.5–10.1)
CHLORIDE SERPL-SCNC: 100 MMOL/L (ref 98–107)
CHLORIDE SERPL-SCNC: 102 MMOL/L (ref 98–107)
CO2 SERPL-SCNC: 22.8 MMOL/L (ref 21–32)
CO2 SERPL-SCNC: 26 MMOL/L (ref 21–32)
CREAT SERPL-MCNC: 6.3 MG/DL (ref 0.6–1.3)
CREAT SERPL-MCNC: 6.4 MG/DL (ref 0.6–1.3)
EOSINOPHIL NFR BLD: 1 % (ref 0–7)
EOSINOPHIL NFR BLD: 8.9 % (ref 0–7)
ERYTHROCYTE [DISTWIDTH] IN BLOOD BY AUTOMATED COUNT: 15.6 % (ref 11.5–14.5)
ERYTHROCYTE [DISTWIDTH] IN BLOOD BY AUTOMATED COUNT: 15.6 % (ref 11.5–14.5)
GLOBULIN SER-MCNC: 3.4 G/L
GLUCOSE SERPL-MCNC: 152 MG/DL (ref 74–106)
GLUCOSE SERPL-MCNC: 99 MG/DL (ref 74–106)
HCT VFR BLD CALC: 34.2 % (ref 36–48)
HCT VFR BLD CALC: 38.3 % (ref 36–48)
HGB BLD-MCNC: 10.9 G/DL (ref 12–16)
HGB BLD-MCNC: 12.4 G/DL (ref 12–16)
IMM GRANULOCYTES NFR BLD: 0.2 % (ref 0–5)
INR PPP: 0.99 (ref 0.85–1.17)
LYMPHOCYTES NFR BLD AUTO: 29 % (ref 15–50)
LYMPHOCYTES NFR BLD AUTO: 31.9 % (ref 15–50)
MAGNESIUM SERPL-MCNC: 1.7 MG/DL (ref 1.8–2.4)
MCH RBC QN AUTO: 32 PG (ref 26–34)
MCH RBC QN AUTO: 32.3 PG (ref 26–34)
MCHC RBC AUTO-ENTMCNC: 31.9 G/DL (ref 31–37)
MCHC RBC AUTO-ENTMCNC: 32.4 G/DL (ref 31–37)
MCV RBC: 100.3 FL (ref 80–100)
MCV RBC: 99.7 FL (ref 80–100)
MONOCYTES NFR BLD: 10.6 % (ref 2–11)
MONOCYTES NFR BLD: 2 % (ref 2–11)
NEUTROPHILS NFR BLD AUTO: 47.2 % (ref 40–80)
NEUTROPHILS NFR BLD AUTO: 67 % (ref 40–80)
OSMOLALITY SERPL CALC.SUM OF ELEC: 278 MOSM/KG (ref 275–300)
OSMOLALITY SERPL CALC.SUM OF ELEC: 284 MOSM/KG (ref 275–300)
PLATELET # BLD EST: NORMAL 10*3/UL
PLATELET # BLD: 233 10X3/UL (ref 130–400)
PLATELET # BLD: 270 10X3/UL (ref 130–400)
PMV BLD AUTO: 10 FL (ref 7.4–10.4)
PMV BLD AUTO: 10.1 FL (ref 7.4–10.4)
POTASSIUM SERPL-SCNC: 4.5 MMOL/L (ref 3.5–5.1)
POTASSIUM SERPL-SCNC: 4.7 MMOL/L (ref 3.5–5.1)
PROT SERPL-MCNC: 6.2 G/DL (ref 6.4–8.2)
PROTHROMBIN TIME: 12.6 SECONDS (ref 11.6–15)
RBC # BLD AUTO: 3.41 10X6/UL (ref 4–5.4)
RBC # BLD AUTO: 3.84 10X6/UL (ref 4–5.4)
SODIUM SERPL-SCNC: 135 MMOL/L (ref 136–145)
SODIUM SERPL-SCNC: 136 MMOL/L (ref 136–145)
WBC # BLD AUTO: 4.3 10X3/UL (ref 4.8–10.8)
WBC # BLD AUTO: 6.8 10X3/UL (ref 4.8–10.8)

## 2019-11-19 PROCEDURE — 03180JD BYPASS LEFT BRACHIAL ARTERY TO UPPER ARM VEIN WITH SYNTHETIC SUBSTITUTE, OPEN APPROACH: ICD-10-PCS | Performed by: SURGERY

## 2019-11-19 PROCEDURE — 057Y3ZZ DILATION OF UPPER VEIN, PERCUTANEOUS APPROACH: ICD-10-PCS | Performed by: SURGERY

## 2019-11-19 PROCEDURE — B5181ZZ FLUOROSCOPY OF SUPERIOR VENA CAVA USING LOW OSMOLAR CONTRAST: ICD-10-PCS | Performed by: SURGERY

## 2019-11-19 NOTE — NUR
PT ARRIVED TO ICU ACCOMPANIED BY NURSING STAFF POST RAPID RESPONSE, SEE RAPID
RESPONSE. WHEN TRANSFERRING, PT MUMBLED AND BRIEFLY WOKE UP FOR A FEW SECONDS
AT A TIME, THEN WOULD FALL ASLEEP REQUIRING DEEP STIMULATION TO AROUSE AGAIN.
PT PLACED ON BIPAP. WILL CONTINUE PLAN OF CARE.

## 2019-11-19 NOTE — NUR
2300 PT WAS TRANSFERED TO ICU YOU AT THIS TIME I GAVE REPORT TO ICU CHARGE
THAT WAS RUNNING RAPID AND ASSISTED TRANSPORT.

## 2019-11-19 NOTE — NUR
EKG PRESENTS WITH VTACH AND QUICKLY CONVERTED TO
NORMAL SINUS RHYTHEM WITHIN 15 SECONDS.   JO
AND ESTEFANIA NOTIFIED.  12 LEAD EKG
OBTAINED.  ORDERS FOR CMP,CBC, MAG LEVELS DRAWN.  TELEMETRY AND HEART
CONSULT OBTAINED.  NO FURTHER ORDERS RECEIVED.

## 2019-11-19 NOTE — NUR
TECH REPORTED TO ME THAT PT HAD A LOW BP I RESPONDED IMEDIATE AS I WAS OUT
SIDE THE ROOM AND FOUND PT ONLY BRIEFLY RESPONSIVE TO A STERNAL RUB AND AGINOL
BREATHING RAPID WAS CALLED IMEEDIATELY AND SUBSIQUENT FINDINGS OF SPO2 79% AND
GLUCOSE . NARCAN AND BIPAP USED DURING RAPID AND BP AND SPO2 IMPROVED.

## 2019-11-19 NOTE — NUR
AWAKE IN BED CO ALOT OF PAIN   MOSTLY PHANTOM PAIN BED LOW AND LOCKED CALL
LIGHT IS WITH PT SR IS ON MONITOR AT THIS TIME...PT DENIES OTHER NEEDS

## 2019-11-19 NOTE — NUR
PT TO ROOM FROM PACU ON BED.  FAMILY AT BEDSIDE. VITALS STABLE AT PRESENT BUT
PT WTIH EPISODE OF VTACH IN RECOVERY.  AV FISTULA TO RIGHT ARM NOTED, THRILL
AND BRUIT PRESENT.  PT IS BLIND BUT ALERT AND ORIENTED.  PENDING CARDIAC
CONSULT.

## 2019-11-20 VITALS — DIASTOLIC BLOOD PRESSURE: 62 MMHG | SYSTOLIC BLOOD PRESSURE: 101 MMHG

## 2019-11-20 VITALS — SYSTOLIC BLOOD PRESSURE: 92 MMHG | DIASTOLIC BLOOD PRESSURE: 52 MMHG

## 2019-11-20 VITALS — DIASTOLIC BLOOD PRESSURE: 58 MMHG | SYSTOLIC BLOOD PRESSURE: 92 MMHG

## 2019-11-20 VITALS — DIASTOLIC BLOOD PRESSURE: 65 MMHG | SYSTOLIC BLOOD PRESSURE: 126 MMHG

## 2019-11-20 VITALS — DIASTOLIC BLOOD PRESSURE: 67 MMHG | SYSTOLIC BLOOD PRESSURE: 103 MMHG

## 2019-11-20 VITALS — DIASTOLIC BLOOD PRESSURE: 98 MMHG | SYSTOLIC BLOOD PRESSURE: 131 MMHG

## 2019-11-20 VITALS — SYSTOLIC BLOOD PRESSURE: 108 MMHG | DIASTOLIC BLOOD PRESSURE: 58 MMHG

## 2019-11-20 VITALS — DIASTOLIC BLOOD PRESSURE: 50 MMHG | SYSTOLIC BLOOD PRESSURE: 82 MMHG

## 2019-11-20 VITALS — DIASTOLIC BLOOD PRESSURE: 69 MMHG | SYSTOLIC BLOOD PRESSURE: 116 MMHG

## 2019-11-20 VITALS — DIASTOLIC BLOOD PRESSURE: 76 MMHG | SYSTOLIC BLOOD PRESSURE: 140 MMHG

## 2019-11-20 VITALS — SYSTOLIC BLOOD PRESSURE: 100 MMHG | DIASTOLIC BLOOD PRESSURE: 55 MMHG

## 2019-11-20 LAB
ANION GAP SERPL CALC-SCNC: 11.9 MMOL/L (ref 8–16)
BASOPHILS NFR BLD AUTO: 0.3 % (ref 0–2)
BUN SERPL-MCNC: 45 MG/DL (ref 7–18)
CALCIUM SERPL-MCNC: 8 MG/DL (ref 8.5–10.1)
CHLORIDE SERPL-SCNC: 104 MMOL/L (ref 98–107)
CO2 SERPL-SCNC: 25.5 MMOL/L (ref 21–32)
CREAT SERPL-MCNC: 6.7 MG/DL (ref 0.6–1.3)
EOSINOPHIL NFR BLD: 4.8 % (ref 0–7)
ERYTHROCYTE [DISTWIDTH] IN BLOOD BY AUTOMATED COUNT: 15.7 % (ref 11.5–14.5)
GLUCOSE SERPL-MCNC: 99 MG/DL (ref 74–106)
HCT VFR BLD CALC: 32.1 % (ref 36–48)
HGB BLD-MCNC: 10.2 G/DL (ref 12–16)
IMM GRANULOCYTES NFR BLD: 0.2 % (ref 0–5)
LYMPHOCYTES NFR BLD AUTO: 23.6 % (ref 15–50)
MCH RBC QN AUTO: 32 PG (ref 26–34)
MCHC RBC AUTO-ENTMCNC: 31.8 G/DL (ref 31–37)
MCV RBC: 100.6 FL (ref 80–100)
MONOCYTES NFR BLD: 9.5 % (ref 2–11)
NEUTROPHILS NFR BLD AUTO: 61.6 % (ref 40–80)
OSMOLALITY SERPL CALC.SUM OF ELEC: 285 MOSM/KG (ref 275–300)
PLATELET # BLD: 211 10X3/UL (ref 130–400)
PMV BLD AUTO: 9.8 FL (ref 7.4–10.4)
POTASSIUM SERPL-SCNC: 4.4 MMOL/L (ref 3.5–5.1)
RBC # BLD AUTO: 3.19 10X6/UL (ref 4–5.4)
SODIUM SERPL-SCNC: 137 MMOL/L (ref 136–145)
WBC # BLD AUTO: 6.5 10X3/UL (ref 4.8–10.8)

## 2019-11-20 PROCEDURE — 5A1D70Z PERFORMANCE OF URINARY FILTRATION, INTERMITTENT, LESS THAN 6 HOURS PER DAY: ICD-10-PCS | Performed by: INTERNAL MEDICINE

## 2019-11-20 NOTE — NUR
PT DISORIENTED TO TIME AND SITUATION. ANSWERING QUESTIONS AT THIS TIME, AWAKE
AND ALERT. WILL CONTINUE TO MONITOR.

## 2019-11-20 NOTE — NUR
PATIENT ARRIVED FROM ICU. SHE IS SITTING UP IN THE BED AND IS ALERT AND AWAKE.
SHE IS REQUESTING TO SEE THE CASE MANAGEMENT AS SOON AS SHE CAN. SHE WANTS TO
DISCUSS DISCHARGE AND REHABILITATION. HER CHOICE IS [place]. SHE WANTS TO
GO TO [place] FOR 20 DAYS TO LEARN TO WALK WITH HER ARTIFICIAL LEG.

## 2019-11-20 NOTE — NUR
PATIENT IS ALERT AND ORIENTED. SHE IS SITTING UP IN BED. SHE IS BLIND AND A
RIGHT ARM RESERVE. THERE ARE SIGNS ON THE DOOR AND ABOVE THE BED. SHE CAN FEED
HERSELF WITH MINIMAL ASSIST OF SET UP.

## 2019-11-20 NOTE — MORECARE
CASE MANAGEMENT DISCHARGE SUMMARY
 
 
PATIENT: DORINDA BENITEZ                        UNIT: [id number]
ACCOUNT#: [id number]                       ADM DATE: 19
AGE: 54     : 61  SEX: F            ROOM/BED: D.2311    
AUTHOR: PATRICIA HSU                             PHYSICIAN:                               
 
REFERRING PHYSICIAN: POLY DOUGLAS MD            
DATE OF SERVICE: 19
Discharge Plan
 
 
Patient Name: DORINDA BENITEZ
Facility: [facility]:[place]
Encounter #: [id number]
Medical Record #: [id number]
: 1965
Planned Disposition: Home
Anticipated Discharge Date: 
 
Discharge Date: 
Expected LOS: 0
Initial Reviewer: [email or web address]
Initial Review Date: 2019
Generated: 19   9:22 am 
 DCPIA - Discharge Planning Initial Assessment
 
Updated by [id number]: Edil French on 19   8:22 am
*  Is the patient Alert and Oriented?
Yes
*  How many steps to enter\exit or inside your home? NONE *  PCP DR. MADISON HAYES * 
Pharmacy
[facility] IN [place]
*  Preadmission Environment
Home with Family
*  ADLs
Independent
*  Equipment
Cane
*  Other Equipment
NO MEDICAL EQUIPMENT PROVIDER PREFERENCE
*  List name and contact numbers for known caregivers / representatives who 
currently or will assist patient after discharge:
TASNEEM MCKEON, DTR, [phone]  NALINI SHAHID, SISTER, [phone]
 
*  Verbal permission to speak to the caregivers and representatives has been 
obtained from the patient.
N/A
*  Community resources currently utilized
 
Other
Private Duty Care
*  Please name any agencies selected above.
OUTPATIENT DIALYSIS, [place], [facility], [address], [facility] MEDICAID TRANSPORTATION  
DAUGHTGER IS PAID CAREGIVER THROUGH [employer], M-F, 2 HOURS PER DAY
*  Additional services required to return to the preadmission environment?
No
*  Can the patient safely return to the preadmission environment?
Yes
*  Has this patient been hospitalized within the prior 30 days at any 
hospital?
No
 
 
 
 
 
 
Patient Name: DORINDA BENITEZ
Encounter #: [id number]
Page [id number]
 
 
 
 
 
Electronically Signed by PATRICIA HSU on 19 at 0823
 
 
 
 
 
 
**All edits/amendments must be made on the electronic document**
 
DICTATION DATE: 19     : FOX  19     
RPT#: [id number]                                DC DATE:        
                                               STATUS: REG SDC 
[facility]
 [address] [address]
***END OF REPORT***

## 2019-11-20 NOTE — NUR
PT CONFUSED TO SITUATION AND TIME. BRIEFLY WAKES UP, THEN FALLS BACK ASLEEP
SHORTLY AFTER. AROUSES TO LIGHT STIMULI. NO ACUTE DISTRESS NOTED AT THIS TIME.

## 2019-11-20 NOTE — MORECARE
CASE MANAGEMENT DISCHARGE SUMMARY
 
 
PATIENT: DORINDA BENITEZ                        UNIT: [id number]
ACCOUNT#: [id number]                       ADM DATE: 19
AGE: 54     : 61  SEX: F            ROOM/BED: D.2311    
AUTHOR: ARACELIS,DOC                             PHYSICIAN:                               
 
REFERRING PHYSICIAN: POLY DOUGLAS MD            
DATE OF SERVICE: 19
Discharge Plan
 
 
Patient Name: DORINDA BENITEZ
Facility: [facility]:[place]
Encounter #: [id number]
Medical Record #: [id number]
: 1965
Planned Disposition: Home
Anticipated Discharge Date: 
 
Discharge Date: 
Expected LOS: 0
Initial Reviewer: [email or web address]
Initial Review Date: 2019
Generated: 19   9:30 am 
Comments
 
DCP- Discharge Planning
 
Updated by [email or web address]: Edil French on 19   7:26 am CT
Patient Name: DORINDA BENITEZ                                     
Admission Status: Elective   
Accout number: [id number]                              
Admission Date: 2019   
: 1965                                                        
Admission Diagnosis:   
Attending: POLY DOUGLAS                                                
Current LOS:  1   
  
Anticipated DC Date:    
Planned Disposition: Home   
Primary Insurance: MEDICARE A & B   
  
  
Discharge Planning Comments:  LATE ENTRY FROM 19, 1645 HOURS:   
CM RECEIVED ORDER FOR IN VS OUT, TRANSPORTATION TO DIALYSIS AND 
TRANSPORTATION HOME.  CM MET WITH PT IN ROOM TO DISCUSS DISCHARGE PLANNING 
AND NEEDS. PT REPORTS LIVING AT HOME INDEPENDENTLY WITH HER ADULT DAUGHTER 
AND SISTER. PT HAS A CANE WITH NO MEDICAL EQUIPMENT PROVIDER PREFERENCE.  PT 
 
HAS PERSONAL CARE THROUGH MEDICAID; [facility] PAYS PT'S DAUGHTER FOR 
CAREGIVING, MONDAY THRU FRIDAY, 2 HOURS PER DAY.  PT GOES TO DIALYSIS IN 
[facility], [facility], [place] MEDICAID TRANSPORTATION BUS.  CM DISCUSSED 
AVAILABILITY OF HOME HEALTH, REHAB SERVICES AND MEDICAL EQUIPMENT. PT STATES 
SHE PLANS TO CONTINUE GOING TO THE [facility] WHERE SHE HAS BEEN DOING 
OUTPATIENT THERAPY.  PT STATES IF SHE NEEDS NURSING HOME REHAB, SHE WANTS 
[facility].  PT PLANS TO RETURN HOME, DENIES DISCHARGE NEEDS, REPORTS 
HER DAUGHTER WILL PICK HER UP FOR DISCHARGE HOME.  PT STATES HAVING MEDICAID 
BUS SET UP FOR DIALYSIS TRANSPORTATION FROM HOME.  
  
PT PLANS TO DISCHARGE HOME WITH FAMILY, DAUGHTER TO TRANSPORT HOME.  PT HAS 
NO ANTICIPATED DISCHARGE NEEDS AT TIME OF ASSESSMENT.   
  
: Edil French
 DCPIA - Discharge Planning Initial Assessment
 
Updated by [id number]: Edil French on 19   8:22 am
*  Is the patient Alert and Oriented?
Yes
*  How many steps to enter\exit or inside your home? NONE *  PCP DR. MADISON HAYES * 
Pharmacy
[facility] IN [place]
*  Preadmission Environment
Home with Family
*  ADLs
Independent
*  Equipment
Cane
*  Other Equipment
NO MEDICAL EQUIPMENT PROVIDER PREFERENCE
*  List name and contact numbers for known caregivers / representatives who 
currently or will assist patient after discharge:
TASNEEM MCKEON, DTR, [phone]  NALINI SHAHID, SISTER, [phone]
 
*  Verbal permission to speak to the caregivers and representatives has been 
obtained from the patient.
N/A
*  Community resources currently utilized
Other
Private Duty Care
*  Please name any agencies selected above.
OUTPATIENT DIALYSIS, [place], [facility], [address], [facility] MEDICAID TRANSPORTATION  
DAUGHTGER IS PAID CAREGIVER THROUGH [facility], 2 HOURS PER DAY
*  Additional services required to return to the preadmission environment?
No
*  Can the patient safely return to the preadmission environment?
Yes
*  Has this patient been hospitalized within the prior 30 days at any 
hospital?
No
 
 
 
 
 
 
 
 
Last DP export: 19   7:23 
Patient Name: DORINDA BENITEZ
Encounter #: [id number]
Page [id number]
 
 
 
 
 
Electronically Signed by PATRICIA HSU on 19 at 0830
 
 
 
 
 
 
**All edits/amendments must be made on the electronic document**
 
DICTATION DATE: 19     : FOX  19     
RPT#: [id number]                                DC DATE:        
                                               STATUS: REG [facility]
 [place] [address]
***END OF REPORT***

## 2019-11-21 VITALS — DIASTOLIC BLOOD PRESSURE: 64 MMHG | SYSTOLIC BLOOD PRESSURE: 125 MMHG

## 2019-11-21 VITALS — SYSTOLIC BLOOD PRESSURE: 118 MMHG | DIASTOLIC BLOOD PRESSURE: 64 MMHG

## 2019-11-21 VITALS — DIASTOLIC BLOOD PRESSURE: 60 MMHG | SYSTOLIC BLOOD PRESSURE: 115 MMHG

## 2019-11-21 VITALS — SYSTOLIC BLOOD PRESSURE: 103 MMHG | DIASTOLIC BLOOD PRESSURE: 62 MMHG

## 2019-11-21 NOTE — NUR
PT RESTING. COFFEE RECIEVED PER REQUEST. DENIES FURTHER NEEDS OR PAIN AT THIS
TIME. BED IN LOWEST POSITION. CALL LIGHT WITHIN REACH. JORGE L CONTINUE TO
MONITOR.

## 2019-11-21 NOTE — MORECARE
CASE MANAGEMENT DISCHARGE SUMMARY
 
 
PATIENT: DORINDA BENITEZ                        UNIT: [id number]
ACCOUNT#: [id number]                       ADM DATE: 19
AGE: 54     : 61  SEX: F            ROOM/BED: D.4344    
AUTHOR: ARACELIS,DOC                             PHYSICIAN:                               
 
REFERRING PHYSICIAN: POLY DOUGLAS MD            
DATE OF SERVICE: 19
Discharge Plan
 
 
Patient Name: DORINDA BENITEZ
Facility: [facility]:[place]
Encounter #: [id number]
Medical Record #: [id number]
: 1965
Planned Disposition: Skilled Nursing Facility
Anticipated Discharge Date: 19
 
Discharge Date: 
Expected LOS: 3
Initial Reviewer: [email or web address]
Initial Review Date: 2019
Generated: 19   5:33 pm 
Comments
 
DCP- Discharge Planning
 
Updated by [id number]: Edil French on 19   3:29 pm CT
Patient Name:  DORINDA BENITEZ   
Encounter No:  [id number]   
:  1965   
Primary Insurance:  MEDICARE A & B  
Anticipated DC Date: 2019   
Planned Disposition:  Skilled Nursing Facility  
External Planned Provider: [facility]  
  
  
DCP follow-up note: CM RECEIVED ORDER FOR NURSING HOME PLACEMENT.  CM MET 
WITH PT IN ROOM, PT WANTS REHAB AT [facility] AS SHE HAS BEEN THERE 
BEFORE.  CHOICE SIGNED.  PT STATES SHE HAS A NEW PROSTHETHIC LEG AT HOME AND 
NEEDS REHAB TO BE ABLE TO WALK ON IT.  CM ADVISED THAT FAMILY NEEDS TO BRING 
THE LEG HERE FOR THERAPY TO EVALUATE AND WORK WITH HER AND THE NEW LEG.  
ORDER FOR PHYSICAL THERAPY EVALUATION OBTAINED.    
  
CM FAXED REFERRAL INFORMATION TO [facility] -[phone]; CM CALLED 
DYLON AT [facility],  [phone]. WHO WILL EVALUATE PT FOR ADMISSION . 
  
 
CM TO FAX PHYSICAL THERAPY EVALUATION TO [facility] AND IS WAITING FOR 
ADMISSION DETERMINATION FROM [facility] FOR REHAB SERVICES.  
  
NAOMI Crespo
DCP- Discharge Planning
 
Updated by [id number]: Edil French on 19   7:26 am CT
Patient Name: DORINDA BENITEZ                                     
Admission Status: Elective   
Accout number: [id number]                              
Admission Date: 2019   
: 1965                                                        
Admission Diagnosis:   
Attending: POLY DOUGLAS                                                
Current LOS:  1   
  
Anticipated DC Date:    
Planned Disposition: Home   
Primary Insurance: MEDICARE A & B   
  
  
Discharge Planning Comments:  LATE ENTRY FROM 19, 1645 HOURS:   
CM RECEIVED ORDER FOR IN VS OUT, TRANSPORTATION TO DIALYSIS AND 
TRANSPORTATION HOME.  CM MET WITH PT IN ROOM TO DISCUSS DISCHARGE PLANNING 
AND NEEDS. PT REPORTS LIVING AT HOME INDEPENDENTLY WITH HER ADULT DAUGHTER 
AND SISTER. PT HAS A CANE WITH NO MEDICAL EQUIPMENT PROVIDER PREFERENCE.  PT 
HAS PERSONAL CARE THROUGH MEDICAID; [employer] PAYS PT'S DAUGHTER FOR 
CAREGIVING, MONDAY THRU FRIDAY, 2 HOURS PER DAY.  PT GOES TO DIALYSIS IN 
[place], [facility], [address], [place] MEDICAID TRANSPORTATION BUS.  CM DISCUSSED 
AVAILABILITY OF HOME HEALTH, REHAB SERVICES AND MEDICAL EQUIPMENT. PT STATES 
SHE PLANS TO CONTINUE GOING TO THE [facility] WHERE SHE HAS BEEN DOING 
OUTPATIENT THERAPY.  PT STATES IF SHE NEEDS NURSING HOME REHAB, SHE WANTS 
[facility].  PT PLANS TO RETURN HOME, DENIES DISCHARGE NEEDS, REPORTS 
HER DAUGHTER WILL PICK HER UP FOR DISCHARGE HOME.  PT STATES HAVING MEDICAID 
BUS SET UP FOR DIALYSIS TRANSPORTATION FROM HOME.  
  
PT PLANS TO DISCHARGE HOME WITH FAMILY, DAUGHTER TO TRANSPORT HOME.  PT HAS 
NO ANTICIPATED DISCHARGE NEEDS AT TIME OF ASSESSMENT.   
  
: Edil French
 DCPIA - Discharge Planning Initial Assessment
 
Updated by [id number]: Edil French on 19   8:22 am
*  Is the patient Alert and Oriented?
Yes
*  How many steps to enter\exit or inside your home? NONE *  PCP DR. MADISON HAYES * 
Pharmacy
[facility] IN [place]
*  Preadmission Environment
Home with Family
*  ADLs
Independent
 
*  Equipment
Cane
*  Other Equipment
NO MEDICAL EQUIPMENT PROVIDER PREFERENCE
*  List name and contact numbers for known caregivers / representatives who 
currently or will assist patient after discharge:
TASNEEM MCKEON, DTR, [phone]  NALINI SHAHID, SISTER, [phone]
 
*  Verbal permission to speak to the caregivers and representatives has been 
obtained from the patient.
N/A
*  Community resources currently utilized
Other
Private Duty Care
*  Please name any agencies selected above.
OUTPATIENT DIALYSIS, [place], [facility], [address], [facility] MEDICAID TRANSPORTATION  
DAUGHTGER IS PAID CAREGIVER THROUGH [employer], M-F, 2 HOURS PER DAY
*  Additional services required to return to the preadmission environment?
No
*  Can the patient safely return to the preadmission environment?
Yes
*  Has this patient been hospitalized within the prior 30 days at any 
hospital?
No
 
 
 
 
 
Coverage Notice
 
Reviewer: [email or web address] Mylene French
 
Notice Issued Date-Time: 2019  11:30
Notice Type: Patient Choice Letter
 
Notice Delivered To: Patient
Relationship to Patient: 
Representative Name: 
 
Delivery Method: HAND - Hand Delivered
Jo Ann Days:
Prior Verbal Notification: 
 
Recipient Understood Notice: Yes
Recipient Signature: Yes
Med Rec Note Co-signed by Attending:
 
Coverage Notice Comment:  [place]
 
Last DP export: 19   3:25 
Patient Name: DORINDA BENITEZ
 
Encounter #: [id number]
Page [id number]
 
 
 
 
 
Electronically Signed by PATRICIA HSU on 19 at 1634
 
 
 
 
 
 
**All edits/amendments must be made on the electronic document**
 
DICTATION DATE: 19     : FOX  19     
RPT#: [id number]                                DC DATE:        
                                               STATUS: ADM IN  
[facility]
[address] [place] [address]
***END OF REPORT***

## 2019-11-21 NOTE — MORECARE
CASE MANAGEMENT DISCHARGE SUMMARY
 
 
PATIENT: DORINDA BENITEZ                        UNIT: [id number]
ACCOUNT#: [id number]                       ADM DATE: 19
AGE: 54     : 61  SEX: F            ROOM/BED: D.1004    
AUTHOR: ARACELISDOC                             PHYSICIAN:                               
 
REFERRING PHYSICIAN: POLY DOUGLAS MD            
DATE OF SERVICE: 19
Discharge Plan
 
 
Patient Name: DORINDA BENITEZ
Facility: [facility]:[place]
Encounter #: [id number]
Medical Record #: [id number]
: 1965
Planned Disposition: Skilled Nursing Facility
Anticipated Discharge Date: 19
 
Discharge Date: 
Expected LOS: 3
Initial Reviewer: [id number]
Initial Review Date: 2019
Generated: 19   5:04 pm 
DCP- Discharge Planning
 
Updated by [id number]: Edil French on 19   7:26 am CT
Patient Name: DORINDA BENITEZ                                     
Admission Status: Elective   
Accout number: [id number]                              
Admission Date: 2019   
: 1965                                                        
Admission Diagnosis:   
Attending: POLY DOUGLAS                                                
Current LOS:  1   
  
Anticipated DC Date:    
Planned Disposition: Home   
Primary Insurance: MEDICARE A & B   
  
  
Discharge Planning Comments:  LATE ENTRY FROM 19, 1645 HOURS:   
CM RECEIVED ORDER FOR IN VS OUT, TRANSPORTATION TO DIALYSIS AND 
TRANSPORTATION HOME.  CM MET WITH PT IN ROOM TO DISCUSS DISCHARGE PLANNING 
AND NEEDS. PT REPORTS LIVING AT HOME INDEPENDENTLY WITH HER ADULT DAUGHTER 
AND SISTER. PT HAS A CANE WITH NO MEDICAL EQUIPMENT PROVIDER PREFERENCE.  PT 
HAS PERSONAL CARE THROUGH MEDICAID; [employer] CARE PAYS PT'S DAUGHTER FOR 
CAREGIVING, MONDAY THRU FRIDAY, 2 HOURS PER DAY.  PT GOES TO DIALYSIS IN 
 
[place], [facility], [address], [address] [employer] TRANSPORTATION BUS.  CM DISCUSSED 
AVAILABILITY OF HOME HEALTH, REHAB SERVICES AND MEDICAL EQUIPMENT. PT STATES 
SHE PLANS TO CONTINUE GOING TO THE [facility] WHERE SHE HAS BEEN DOING 
OUTPATIENT THERAPY.  PT STATES IF SHE NEEDS NURSING HOME REHAB, SHE WANTS 
[facility].  PT PLANS TO RETURN HOME, DENIES DISCHARGE NEEDS, REPORTS 
HER DAUGHTER WILL PICK HER UP FOR DISCHARGE HOME.  PT STATES HAVING MEDICAID 
BUS SET UP FOR DIALYSIS TRANSPORTATION FROM HOME.  
  
PT PLANS TO DISCHARGE HOME WITH FAMILY, DAUGHTER TO TRANSPORT HOME.  PT HAS 
NO ANTICIPATED DISCHARGE NEEDS AT TIME OF ASSESSMENT.   
  
: Edil French
 DCPIA - Discharge Planning Initial Assessment
 
Updated by [id number]: Edil French on 19   8:22 am
*  Is the patient Alert and Oriented?
Yes
*  How many steps to enter\exit or inside your home? NONE *  PCP DR. MADISON HAYES * 
Pharmacy
[facility] IN [place]
*  Preadmission Environment
Home with Family
*  ADLs
Independent
*  Equipment
Cane
*  Other Equipment
NO MEDICAL EQUIPMENT PROVIDER PREFERENCE
*  List name and contact numbers for known caregivers / representatives who 
currently or will assist patient after discharge:
TASNEEM MCKEON, DTR, [phone]  NALINI NUNEZNEMYRNA, SISTER, [phone]
 
*  Verbal permission to speak to the caregivers and representatives has been 
obtained from the patient.
N/A
*  Community resources currently utilized
Other
Private Duty Care
*  Please name any agencies selected above.
OUTPATIENT DIALYSIS, [place], [facility], [address], [employer] MED[employer] TRANSPORTATION  
DAUGHTGER IS PAID CAREGIVER THROUGH [employer], [place], 2 HOURS PER DAY
*  Additional services required to return to the preadmission environment?
No
*  Can the patient safely return to the preadmission environment?
Yes
*  Has this patient been hospitalized within the prior 30 days at any 
hospital?
No
 
 
 
 
 
 
 
 
Last DP export: 19   7:30 
Patient Name: DORINDA BENITEZ
Encounter #: [id number]
Page [id number]
 
 
 
 
 
Electronically Signed by PATRICIA HSU on 19 at 1604
 
 
 
 
 
 
**All edits/amendments must be made on the electronic document**
 
DICTATION DATE: 19     : FOX  19     
RPT#: [id number]                                DC DATE:        
                                               STATUS: ADM IN  
[facility]
 [place] [address]
***END OF REPORT***

## 2019-11-21 NOTE — MORECARE
CASE MANAGEMENT DISCHARGE SUMMARY
 
 
PATIENT: DORINDA BENITEZ                        UNIT: [id number]
ACCOUNT#: [id number]                       ADM DATE: 19
AGE: 54     : 61  SEX: F            ROOM/BED: D.7718    
AUTHOR: ARACELISDOC                             PHYSICIAN:                               
 
REFERRING PHYSICIAN: POLY DOUGLAS MD            
DATE OF SERVICE: 19
Discharge Plan
 
 
Patient Name: DORINDA BENITEZ
Facility: [facility]:[place]
Encounter #: [id number]
Medical Record #: [id number]
: 1965
Planned Disposition: Skilled Nursing Facility
Anticipated Discharge Date: 19
 
Discharge Date: 
Expected LOS: 3
Initial Reviewer: [id number]
Initial Review Date: 2019
Generated: 19   5:25 pm 
DCP- Discharge Planning
 
Updated by [id number]: Edil French on 19   7:26 am CT
Patient Name: DORINDA BENITEZ                                     
Admission Status: Elective   
Accout number: [id number]                              
Admission Date: 2019   
: 1965                                                        
Admission Diagnosis:   
Attending: POLY DOUGLAS                                                
Current LOS:  1   
  
Anticipated DC Date:    
Planned Disposition: Home   
Primary Insurance: MEDICARE A & B   
  
  
Discharge Planning Comments:  LATE ENTRY FROM 19, 1645 HOURS:   
CM RECEIVED ORDER FOR IN VS OUT, TRANSPORTATION TO DIALYSIS AND 
TRANSPORTATION HOME.  CM MET WITH PT IN ROOM TO DISCUSS DISCHARGE PLANNING 
AND NEEDS. PT REPORTS LIVING AT HOME INDEPENDENTLY WITH HER ADULT DAUGHTER 
AND SISTER. PT HAS A CANE WITH NO MEDICAL EQUIPMENT PROVIDER PREFERENCE.  PT 
HAS PERSONAL CARE THROUGH MEDICAID; [employer] CARE PAYS PT'S DAUGHTER FOR 
CAREGIVING, MONDAY THRU FRIDAY, 2 HOURS PER DAY.  PT GOES TO DIALYSIS IN 
 
[place], [facility], [address], [address] MEDICA[employer] TRANSPORTATION BUS.  CM DISCUSSED 
AVAILABILITY OF HOME HEALTH, REHAB SERVICES AND MEDICAL EQUIPMENT. PT STATES 
SHE PLANS TO CONTINUE GOING TO THE [facility] WHERE SHE HAS BEEN DOING 
OUTPATIENT THERAPY.  PT STATES IF SHE NEEDS NURSING HOME REHAB, SHE WANTS 
[facility].  PT PLANS TO RETURN HOME, DENIES DISCHARGE NEEDS, REPORTS 
HER DAUGHTER WILL PICK HER UP FOR DISCHARGE HOME.  PT STATES HAVING MEDICAID 
BUS SET UP FOR DIALYSIS TRANSPORTATION FROM HOME.  
  
PT PLANS TO DISCHARGE HOME WITH FAMILY, DAUGHTER TO TRANSPORT HOME.  PT HAS 
NO ANTICIPATED DISCHARGE NEEDS AT TIME OF ASSESSMENT.   
  
: Edil French
 DCPIA - Discharge Planning Initial Assessment
 
Updated by [id number]: Edil French on 19   8:22 am
*  Is the patient Alert and Oriented?
Yes
*  How many steps to enter\exit or inside your home? NONE *  PCP DR. MADISON HAYES * 
Pharmacy
[facility] IN [place]
*  Preadmission Environment
Home with Family
*  ADLs
Independent
*  Equipment
Cane
*  Other Equipment
NO MEDICAL EQUIPMENT PROVIDER PREFERENCE
*  List name and contact numbers for known caregivers / representatives who 
currently or will assist patient after discharge:
TASNEEM MCKEON, DTR, [phone]  NALINI SHAHID, SISTER, [phone]
 
*  Verbal permission to speak to the caregivers and representatives has been 
obtained from the patient.
N/A
*  Community resources currently utilized
Other
Private Duty Care
*  Please name any agencies selected above.
OUTPATIENT DIALYSIS, [place], [place], [address], [place] MED[employer] TRANSPORTATION  
DAUGHTGER IS PAID CAREGIVER THROUGH [employer], [place], 2 HOURS PER DAY
*  Additional services required to return to the preadmission environment?
No
*  Can the patient safely return to the preadmission environment?
Yes
*  Has this patient been hospitalized within the prior 30 days at any 
hospital?
No
 
External Providers
External Provider: [facility] and [facility]
 
 
Next Contact Date: 2019
Service Request Date: 
Service Type: 
Resolution: 
 
Reviewer: 
Comments: 
 
 
 
 
 
 
Last DP export: 19   3:04 
Patient Name: DORINDA BENITEZ
Encounter #: [id number]
Page [id number]
 
 
 
 
 
Electronically Signed by PATRICIA HSU on 19 at 1625
 
 
 
 
 
 
**All edits/amendments must be made on the electronic document**
 
DICTATION DATE: 19     : FOX  19     
RPT#: [id number]                                DC DATE:        
                                               STATUS: ADM IN  
[facility]
[address] [place] [address]
***END OF REPORT***

## 2019-11-21 NOTE — NUR
PT reports accidentally removing the dressing covering her fistula in her
right upper arm. She states she scratches her skin in her sleep sometimes and
she woke up and the dressing was in the bed. Incision is clean and dry and
there is no redness or swelling. Thrill and bruit present. Placed a non
adherent dressing over fistula incision and covered with a tegaderm.

## 2019-11-22 VITALS — DIASTOLIC BLOOD PRESSURE: 69 MMHG | SYSTOLIC BLOOD PRESSURE: 144 MMHG

## 2019-11-22 VITALS — SYSTOLIC BLOOD PRESSURE: 122 MMHG | DIASTOLIC BLOOD PRESSURE: 68 MMHG

## 2019-11-22 VITALS — SYSTOLIC BLOOD PRESSURE: 130 MMHG | DIASTOLIC BLOOD PRESSURE: 70 MMHG

## 2019-11-22 VITALS — DIASTOLIC BLOOD PRESSURE: 68 MMHG | SYSTOLIC BLOOD PRESSURE: 120 MMHG

## 2019-11-22 VITALS — DIASTOLIC BLOOD PRESSURE: 56 MMHG | SYSTOLIC BLOOD PRESSURE: 109 MMHG

## 2019-11-22 VITALS — DIASTOLIC BLOOD PRESSURE: 64 MMHG | SYSTOLIC BLOOD PRESSURE: 132 MMHG

## 2019-11-22 VITALS — DIASTOLIC BLOOD PRESSURE: 57 MMHG | SYSTOLIC BLOOD PRESSURE: 109 MMHG

## 2019-11-22 LAB
ALBUMIN SERPL-MCNC: 2.7 G/DL (ref 3.4–5)
ALP SERPL-CCNC: 107 U/L (ref 46–116)
ALT SERPL-CCNC: 9 U/L (ref 10–68)
ANION GAP SERPL CALC-SCNC: 11.8 MMOL/L (ref 8–16)
BILIRUB SERPL-MCNC: 0.22 MG/DL (ref 0.2–1.3)
BUN SERPL-MCNC: 32 MG/DL (ref 7–18)
CALCIUM SERPL-MCNC: 8.2 MG/DL (ref 8.5–10.1)
CHLORIDE SERPL-SCNC: 101 MMOL/L (ref 98–107)
CO2 SERPL-SCNC: 26.7 MMOL/L (ref 21–32)
CREAT SERPL-MCNC: 5 MG/DL (ref 0.6–1.3)
ERYTHROCYTE [DISTWIDTH] IN BLOOD BY AUTOMATED COUNT: 15.2 % (ref 11.5–14.5)
GLOBULIN SER-MCNC: 3.7 G/L
GLUCOSE SERPL-MCNC: 116 MG/DL (ref 74–106)
HCT VFR BLD CALC: 31.3 % (ref 36–48)
HCV AB S/CO SERPL IA: 0.1 S/CO RAT (ref 0–0.9)
HGB BLD-MCNC: 10.5 G/DL (ref 12–16)
LYMPHOCYTES NFR BLD AUTO: 39.2 % (ref 15–50)
MCH RBC QN AUTO: 32.8 PG (ref 26–34)
MCHC RBC AUTO-ENTMCNC: 33.5 G/DL (ref 31–37)
MCV RBC: 97.8 FL (ref 80–100)
NEUTROPHILS NFR BLD AUTO: 47.2 % (ref 40–80)
OSMOLALITY SERPL CALC.SUM OF ELEC: 277 MOSM/KG (ref 275–300)
PHOSPHATE SERPL-MCNC: 4.5 MG/DL (ref 2.5–4.9)
PLATELET # BLD: 139 10X3/UL (ref 130–400)
PMV BLD AUTO: 10 FL (ref 7.4–10.4)
POTASSIUM SERPL-SCNC: 4.5 MMOL/L (ref 3.5–5.1)
PROT SERPL-MCNC: 6.4 G/DL (ref 6.4–8.2)
RBC # BLD AUTO: 3.2 10X6/UL (ref 4–5.4)
SODIUM SERPL-SCNC: 135 MMOL/L (ref 136–145)
WBC # BLD AUTO: 4.8 10X3/UL (ref 4.8–10.8)

## 2019-11-22 NOTE — NUR
PT SITTING UP IN BED LISTENING TO TV. NO S/S OF DISTRESS AT THIS TIME. RR EVEN
AND UNLABORED. VITALS STABLE. BED LOW CALL LIGHT WITHIN REACH. WILL CONTINUE
TO MONITOR.

## 2019-11-22 NOTE — NUR
CHANGED DRESSING ON PT'S LEFT GROIN HEMASPLIT USING STERILE TECHNIQUE.
DRESSING INTIALED AND DATED. C/D/I

## 2019-11-22 NOTE — MORECARE
CASE MANAGEMENT DISCHARGE SUMMARY
 
 
PATIENT: DORINDA BENITEZ                        UNIT: [id number]
ACCOUNT#: [id number]                       ADM DATE: 19
AGE: 54     : 61  SEX: F            ROOM/BED: D.3296    
AUTHOR: ARACELIS,DOC                             PHYSICIAN:                               
 
REFERRING PHYSICIAN: POLY DOUGLAS MD            
DATE OF SERVICE: 19
Discharge Plan
 
 
Patient Name: DORINDA BENITEZ
Facility: [facility]:[place]
Encounter #: [id number]
Medical Record #: [id number]
: 1965
Planned Disposition: Skilled Nursing Facility
Anticipated Discharge Date: 19
 
Discharge Date: 
Expected LOS: 3
Initial Reviewer: [id number]
Initial Review Date: 2019
Generated: 19   5:51 pm 
Comments
 
DCP- Discharge Planning
 
Updated by [id number]: Zoe Vazquez on 19   3:44 pm CT
Patient Name: DORINDA BENITEZ                                     
Admission Status: Elective   
Accout number: [id number]                              
Admission Date: 2019   
: 1965                                                        
Admission Diagnosis:   
Attending: POLY DOUGLAS                                                
Current LOS:  2   
  
Anticipated DC Date: 2019   
Planned Disposition: Skilled Nursing Facility   
Primary Insurance: MEDICARE A & B   
  
  
Discharge Planning Comments: UPDATE FAXED TO [facility].   
  
  
  
  
 
  
  
: Zoe Vazquez
DCP- Discharge Planning
 
Updated by [id number]: Edil French on 19   3:29 pm CT
Patient Name:  DORINDA BENITEZ   
Encounter No:  [id number]   
:  1965   
Primary Insurance:  MEDICARE A & B  
Anticipated DC Date: 2019   
Planned Disposition:  Skilled Nursing Facility  
External Planned Provider: [facility], MEDICARE REHAB BED  
  
  
DCP follow-up note: CM RECEIVED ORDER FOR NURSING HOME PLACEMENT.  CM MET 
WITH PT IN ROOM, PT WANTS REHAB AT [facility] AS SHE HAS BEEN THERE 
BEFORE.  CHOICE SIGNED.  PT STATES SHE HAS A NEW PROSTHETHIC LEG AT HOME AND 
NEEDS REHAB TO BE ABLE TO WALK ON IT.  CM ADVISED THAT FAMILY NEEDS TO BRING 
THE LEG HERE FOR THERAPY TO EVALUATE AND WORK WITH HER AND THE NEW LEG.  
ORDER FOR PHYSICAL THERAPY EVALUATION OBTAINED.    
  
CM FAXED REFERRAL INFORMATION TO [facility] [phone]; CM CALLED 
DYLON AT [facility],  [phone]. WHO WILL EVALUATE PT FOR ADMISSION . 
  
CM TO FAX PHYSICAL THERAPY EVALUATION TO [facility] AND IS WAITING FOR 
ADMISSION DETERMINATION FROM [facility] FOR REHAB SERVICES.  
  
Edil French, CASE MANAGEMENT
DCP- Discharge Planning
 
Updated by [id number]: Edil French on 19   7:26 am CT
Patient Name: DORINDA BENITEZ                                     
Admission Status: Elective   
Accout number: [id number]                              
Admission Date: 2019   
: 1965                                                        
Admission Diagnosis:   
Attending: POLY DOUGLAS                                                
Current LOS:  1   
  
Anticipated DC Date:    
Planned Disposition: Home   
Primary Insurance: MEDICARE A & B   
  
  
Discharge Planning Comments:  LATE ENTRY FROM 19, 1645 HOURS:   
CM RECEIVED ORDER FOR IN VS OUT, TRANSPORTATION TO DIALYSIS AND 
TRANSPORTATION HOME.  CM MET WITH PT IN ROOM TO DISCUSS DISCHARGE PLANNING 
AND NEEDS. PT REPORTS LIVING AT HOME INDEPENDENTLY WITH HER ADULT DAUGHTER 
AND SISTER. PT HAS A CANE WITH NO MEDICAL EQUIPMENT PROVIDER PREFERENCE.  PT 
HAS PERSONAL CARE THROUGH MEDICAID; [employer] PAYS PT'S DAUGHTER FOR 
 
CAREGIVING, MONDAY THRU FRIDAY, 2 HOURS PER DAY.  PT GOES TO DIALYSIS IN 
[place], [facility], [address], [place] MEDICA[employer] TRANSPORTATION BUS.  CM DISCUSSED 
AVAILABILITY OF HOME HEALTH, REHAB SERVICES AND MEDICAL EQUIPMENT. PT STATES 
SHE PLANS TO CONTINUE GOING TO THE [facility] WHERE SHE HAS BEEN DOING 
OUTPATIENT THERAPY.  PT STATES IF SHE NEEDS NURSING HOME REHAB, SHE WANTS 
[facility].  PT PLANS TO RETURN HOME, DENIES DISCHARGE NEEDS, REPORTS 
HER DAUGHTER WILL PICK HER UP FOR DISCHARGE HOME.  PT STATES HAVING MEDICAID 
BUS SET UP FOR DIALYSIS TRANSPORTATION FROM HOME.  
  
PT PLANS TO DISCHARGE HOME WITH FAMILY, DAUGHTER TO TRANSPORT HOME.  PT HAS 
NO ANTICIPATED DISCHARGE NEEDS AT TIME OF ASSESSMENT.   
  
: Edil French
 DCPIA - Discharge Planning Initial Assessment
 
Updated by [id number]: Edil French on 19   8:22 am
*  Is the patient Alert and Oriented?
Yes
*  How many steps to enter\exit or inside your home? NONE *  PCP DR. MADISON HAYES * 
Pharmacy
[facility] IN [place]
*  Preadmission Environment
Home with Family
*  ADLs
Independent
*  Equipment
Cane
*  Other Equipment
NO MEDICAL EQUIPMENT PROVIDER PREFERENCE
*  List name and contact numbers for known caregivers / representatives who 
currently or will assist patient after discharge:
TASNEEM MCKEON, DTR, [phone]  NALINI SHAHID, SISTER, [phone]
 
*  Verbal permission to speak to the caregivers and representatives has been 
obtained from the patient.
N/A
*  Community resources currently utilized
Other
Private Duty Care
*  Please name any agencies selected above.
OUTPATIENT DIALYSIS, [place], MWF, [address], [place] MED[employer] TRANSPORTATION  
ARACELIGER IS PAID CAREGIVER THROUGH [employer], [place], 2 HOURS PER DAY
*  Additional services required to return to the preadmission environment?
No
*  Can the patient safely return to the preadmission environment?
Yes
*  Has this patient been hospitalized within the prior 30 days at any 
hospital?
No
 
 
 
 
 
 
Coverage Notice
 
Reviewer: [email or web address] Mylene French
 
Notice Issued Date-Time: 2019  11:30
Notice Type: Patient Choice Letter
 
Notice Delivered To: Patient
Relationship to Patient: 
Representative Name: 
 
Delivery Method: HAND - Hand Delivered
Jo Ann Days:
Prior Verbal Notification: 
 
Recipient Understood Notice: Yes
Recipient Signature: Yes
Med Rec Note Co-signed by Attending:
 
Coverage Notice Comment:  [facility]
 
Last DP export: 19   3:34 
Patient Name: DORINDA BENITEZ
Encounter #: [id number]
Page [id number]
 
 
 
 
 
Electronically Signed by PATRICIA HSU on 19 at 1651
 
 
 
 
 
 
**All edits/amendments must be made on the electronic document**
 
DICTATION DATE: 19     : FOX  19     
RPT#: [id number]                                DC DATE:        
                                               STATUS: ADM IN  
[facility]
 [place] [address]
***END OF REPORT***

## 2019-11-23 VITALS — SYSTOLIC BLOOD PRESSURE: 129 MMHG | DIASTOLIC BLOOD PRESSURE: 72 MMHG

## 2019-11-23 VITALS — SYSTOLIC BLOOD PRESSURE: 128 MMHG | DIASTOLIC BLOOD PRESSURE: 72 MMHG

## 2019-11-23 VITALS — DIASTOLIC BLOOD PRESSURE: 72 MMHG | SYSTOLIC BLOOD PRESSURE: 40 MMHG

## 2019-11-23 VITALS — DIASTOLIC BLOOD PRESSURE: 60 MMHG | SYSTOLIC BLOOD PRESSURE: 117 MMHG

## 2019-11-23 LAB
ANION GAP SERPL CALC-SCNC: 14.1 MMOL/L (ref 8–16)
BUN SERPL-MCNC: 53 MG/DL (ref 7–18)
CALCIUM SERPL-MCNC: 8.1 MG/DL (ref 8.5–10.1)
CHLORIDE SERPL-SCNC: 101 MMOL/L (ref 98–107)
CO2 SERPL-SCNC: 26.8 MMOL/L (ref 21–32)
CREAT SERPL-MCNC: 6.3 MG/DL (ref 0.6–1.3)
GLUCOSE SERPL-MCNC: 115 MG/DL (ref 74–106)
OSMOLALITY SERPL CALC.SUM OF ELEC: 288 MOSM/KG (ref 275–300)
PHOSPHATE SERPL-MCNC: 5 MG/DL (ref 2.5–4.9)
POTASSIUM SERPL-SCNC: 4.9 MMOL/L (ref 3.5–5.1)
SODIUM SERPL-SCNC: 137 MMOL/L (ref 136–145)

## 2019-11-23 NOTE — NUR
PT RESTING IN BED WITH EYES CLOSED. RR EVEN AND UNLABORED. NO S/S OF DISTRESS
AT THIS TIME. BED LOW CALL LIGHT WITHIN REACH. WILL CONTINUE TO MONITOR.

## 2019-11-23 NOTE — NUR
PT IS IN DIALYSIS, DIALYSIS CALL TO SAY PT WAS ASKING FOR A PAIN WILL, TOOK
ESTEBAN DOWN TO AS ORDER

## 2019-11-24 VITALS — SYSTOLIC BLOOD PRESSURE: 124 MMHG | DIASTOLIC BLOOD PRESSURE: 67 MMHG

## 2019-11-24 VITALS — SYSTOLIC BLOOD PRESSURE: 148 MMHG | DIASTOLIC BLOOD PRESSURE: 74 MMHG

## 2019-11-24 VITALS — DIASTOLIC BLOOD PRESSURE: 72 MMHG | SYSTOLIC BLOOD PRESSURE: 144 MMHG

## 2019-11-24 VITALS — SYSTOLIC BLOOD PRESSURE: 132 MMHG | DIASTOLIC BLOOD PRESSURE: 66 MMHG

## 2019-11-24 VITALS — DIASTOLIC BLOOD PRESSURE: 86 MMHG | SYSTOLIC BLOOD PRESSURE: 172 MMHG

## 2019-11-24 NOTE — NUR
RECEIVED REPORT, WILL ASSUME CARE OF PT, VISITING WITH DAUGHTER AND
GRANDDAUTHERS, DENIES ANY NEEDS AT THIS TIME, BED IS LOW, SRX2, CALL LIGHT IN
REACH, WILL CONTINUE PLAN OF CARE

## 2019-11-24 NOTE — NUR
PT RESTING RR EVEN AND UNLABORED. DENIES NEEDS OR PAIN AT THIS TIME. BED IN
LOWEST POSITION. CALL LIGH WITHIN REACH. PT ASSISTED WITH MEAL AND WITH MENU.
WILL CONTINUE TO MONITOR.

## 2019-11-25 VITALS — DIASTOLIC BLOOD PRESSURE: 75 MMHG | SYSTOLIC BLOOD PRESSURE: 147 MMHG

## 2019-11-25 VITALS — DIASTOLIC BLOOD PRESSURE: 74 MMHG | SYSTOLIC BLOOD PRESSURE: 153 MMHG

## 2019-11-25 VITALS — DIASTOLIC BLOOD PRESSURE: 64 MMHG | SYSTOLIC BLOOD PRESSURE: 136 MMHG

## 2019-11-25 VITALS — SYSTOLIC BLOOD PRESSURE: 124 MMHG | DIASTOLIC BLOOD PRESSURE: 66 MMHG

## 2019-11-25 VITALS — SYSTOLIC BLOOD PRESSURE: 136 MMHG | DIASTOLIC BLOOD PRESSURE: 74 MMHG

## 2019-11-25 LAB
ALBUMIN SERPL-MCNC: 2.9 G/DL (ref 3.4–5)
ALP SERPL-CCNC: 100 U/L (ref 46–116)
ALT SERPL-CCNC: 14 U/L (ref 10–68)
ANION GAP SERPL CALC-SCNC: 11.4 MMOL/L (ref 8–16)
BASOPHILS NFR BLD AUTO: 0.7 % (ref 0–2)
BILIRUB SERPL-MCNC: 0.24 MG/DL (ref 0.2–1.3)
BUN SERPL-MCNC: 53 MG/DL (ref 7–18)
CALCIUM SERPL-MCNC: 8.5 MG/DL (ref 8.5–10.1)
CHLORIDE SERPL-SCNC: 103 MMOL/L (ref 98–107)
CO2 SERPL-SCNC: 27.5 MMOL/L (ref 21–32)
CREAT SERPL-MCNC: 5.5 MG/DL (ref 0.6–1.3)
EOSINOPHIL NFR BLD: 9.3 % (ref 0–7)
ERYTHROCYTE [DISTWIDTH] IN BLOOD BY AUTOMATED COUNT: 15.1 % (ref 11.5–14.5)
GLOBULIN SER-MCNC: 3.9 G/L
GLUCOSE SERPL-MCNC: 83 MG/DL (ref 74–106)
HCT VFR BLD CALC: 31.9 % (ref 36–48)
HGB BLD-MCNC: 10.3 G/DL (ref 12–16)
IMM GRANULOCYTES NFR BLD: 0.2 % (ref 0–5)
LYMPHOCYTES NFR BLD AUTO: 38.4 % (ref 15–50)
MCH RBC QN AUTO: 32.4 PG (ref 26–34)
MCHC RBC AUTO-ENTMCNC: 32.3 G/DL (ref 31–37)
MCV RBC: 100.3 FL (ref 80–100)
MONOCYTES NFR BLD: 10.2 % (ref 2–11)
NEUTROPHILS NFR BLD AUTO: 41.2 % (ref 40–80)
OSMOLALITY SERPL CALC.SUM OF ELEC: 284 MOSM/KG (ref 275–300)
PLATELET # BLD: 228 10X3/UL (ref 130–400)
PMV BLD AUTO: 10.3 FL (ref 7.4–10.4)
POTASSIUM SERPL-SCNC: 5.9 MMOL/L (ref 3.5–5.1)
PROT SERPL-MCNC: 6.8 G/DL (ref 6.4–8.2)
RBC # BLD AUTO: 3.18 10X6/UL (ref 4–5.4)
SODIUM SERPL-SCNC: 136 MMOL/L (ref 136–145)
WBC # BLD AUTO: 5.6 10X3/UL (ref 4.8–10.8)

## 2019-11-25 NOTE — NUR
Nutrition Follow-up:
Good/fair PO intake. Per record, ate 100% of breakfast this AM.
Diet: Renal ADA
PO intake: 79% avg x 7 meals
No new wt
No BMs recorded
Labs noted: K+ 5.9, Alb 2.9
Meds noted: Senokot, Miralax, Nephrovite, Renvela
-Continue current diet as tolerated.
-RD following.

## 2019-11-25 NOTE — MORECARE
CASE MANAGEMENT DISCHARGE SUMMARY
 
 
PATIENT: DORINDA BENITEZ                        UNIT: [id number]
ACCOUNT#: [id number]                       ADM DATE: 19
AGE: 54     : 61  SEX: F            ROOM/BED: D.5520    
AUTHOR: ARACELIS,DOC                             PHYSICIAN:                               
 
REFERRING PHYSICIAN: POLY DOUGLAS MD            
DATE OF SERVICE: 19
Discharge Plan
 
 
Patient Name: DORINDA BENITEZ
Facility: [facility]:[place]
Encounter #: [id number]
Medical Record #: [id number]
: 1965
Planned Disposition: Skilled Nursing Facility
Anticipated Discharge Date: 19
 
Discharge Date: 
Expected LOS: 6
Initial Reviewer: [email or web address]
Initial Review Date: 2019
Generated: 19   1:12 pm 
DCP- Discharge Planning
 
Updated by [id number]: Zoe Vazquez on 19   3:44 pm CT
Patient Name: DORINDA BENITEZ                                     
Admission Status: Elective   
Accout number: [id number]                              
Admission Date: 2019   
: 1965                                                        
Admission Diagnosis:   
Attending: POLY DOUGLAS                                                
Current LOS:  2   
  
Anticipated DC Date: 2019   
Planned Disposition: Skilled Nursing Facility   
Primary Insurance: MEDICARE A & B   
  
  
Discharge Planning Comments: UPDATE FAXED TO [facility].   
  
  
  
  
  
  
 
: Zoe Vazquez
DCP- Discharge Planning
 
Updated by [id number]: Edil French on 19   3:29 pm CT
Patient Name:  DORINDA BENITEZ   
Encounter No:  [id number]   
:  1965   
Primary Insurance:  MEDICARE A & B  
Anticipated DC Date: 2019   
Planned Disposition:  Skilled Nursing Facility  
External Planned Provider: [facility], MEDICARE REHAB BED  
  
  
DCP follow-up note: CM RECEIVED ORDER FOR NURSING HOME PLACEMENT.  CM MET 
WITH PT IN ROOM, PT WANTS REHAB AT [facility] AS SHE HAS BEEN THERE 
BEFORE.  CHOICE SIGNED.  PT STATES SHE HAS A NEW PROSTHETHIC LEG AT HOME AND 
NEEDS REHAB TO BE ABLE TO WALK ON IT.  CM ADVISED THAT FAMILY NEEDS TO BRING 
THE LEG HERE FOR THERAPY TO EVALUATE AND WORK WITH HER AND THE NEW LEG.  
ORDER FOR PHYSICAL THERAPY EVALUATION OBTAINED.    
  
CM FAXED REFERRAL INFORMATION TO [facility] [phone]; CM CALLED 
DYLON AT [facility],  [phone]. WHO WILL EVALUATE PT FOR ADMISSION . 
  
CM TO FAX PHYSICAL THERAPY EVALUATION TO [facility] AND IS WAITING FOR 
ADMISSION DETERMINATION FROM [facility] FOR REHAB SERVICES.  
  
Edil French, CASE MANAGEMENT
DCP- Discharge Planning
 
Updated by [id number]: Edil French on 19   7:26 am CT
Patient Name: DORINDA BENITEZ                                     
Admission Status: Elective   
Accout number: [id number]                              
Admission Date: 2019   
: 1965                                                        
Admission Diagnosis:   
Attending: POLY DOUGLAS                                                
Current LOS:  1   
  
Anticipated DC Date:    
Planned Disposition: Home   
Primary Insurance: MEDICARE A & B   
  
  
Discharge Planning Comments:  LATE ENTRY FROM 19, 1645 HOURS:   
CM RECEIVED ORDER FOR IN VS OUT, TRANSPORTATION TO DIALYSIS AND 
TRANSPORTATION HOME.  CM MET WITH PT IN ROOM TO DISCUSS DISCHARGE PLANNING 
AND NEEDS. PT REPORTS LIVING AT HOME INDEPENDENTLY WITH HER ADULT DAUGHTER 
AND SISTER. PT HAS A CANE WITH NO MEDICAL EQUIPMENT PROVIDER PREFERENCE.  PT 
HAS PERSONAL CARE THROUGH MEDICAID; [employer] PAYS PT'S DAUGHTER FOR 
CAREGIVING, MONDAY THRU FRIDAY, 2 HOURS PER DAY.  PT GOES TO DIALYSIS IN 
[place], [facility], [address], [place] MEDICAID TRANSPORTATION BUS.  CM DISCUSSED 
 
AVAILABILITY OF HOME HEALTH, REHAB SERVICES AND MEDICAL EQUIPMENT. PT STATES 
SHE PLANS TO CONTINUE GOING TO THE [facility] WHERE SHE HAS BEEN DOING 
OUTPATIENT THERAPY.  PT STATES IF SHE NEEDS NURSING HOME REHAB, SHE WANTS 
[facility].  PT PLANS TO RETURN HOME, DENIES DISCHARGE NEEDS, REPORTS 
HER DAUGHTER WILL PICK HER UP FOR DISCHARGE HOME.  PT STATES HAVING MEDICAID 
BUS SET UP FOR DIALYSIS TRANSPORTATION FROM HOME.  
  
PT PLANS TO DISCHARGE HOME WITH FAMILY, DAUGHTER TO TRANSPORT HOME.  PT HAS 
NO ANTICIPATED DISCHARGE NEEDS AT TIME OF ASSESSMENT.   
  
: Edil French
 DCPIA - Discharge Planning Initial Assessment
 
Updated by [id number]: Edil French on 19   8:22 am
*  Is the patient Alert and Oriented?
Yes
*  How many steps to enter\exit or inside your home? NONE *  PCP DR. MADISON HAYES * 
Pharmacy
[facility] IN [place]
*  Preadmission Environment
Home with Family
*  ADLs
Independent
*  Equipment
Cane
*  Other Equipment
NO MEDICAL EQUIPMENT PROVIDER PREFERENCE
*  List name and contact numbers for known caregivers / representatives who 
currently or will assist patient after discharge:
TASNEEM MCKEON, DTR, [phone]  NALINI SHAHID, SISTER, [phone]
 
*  Verbal permission to speak to the caregivers and representatives has been 
obtained from the patient.
N/A
*  Community resources currently utilized
Other
Private Duty Care
*  Please name any agencies selected above.
OUTPATIENT DIALYSIS, [place], MWF, [address], [place] MEDICAID TRANSPORTATION  
HUSEYIN IS PAID CAREGIVER THROUGH [email or web address], [place], 2 HOURS PER DAY
*  Additional services required to return to the preadmission environment?
No
*  Can the patient safely return to the preadmission environment?
Yes
*  Has this patient been hospitalized within the prior 30 days at any 
hospital?
No
 
 
 
 
 
 
Coverage Notice
 
Reviewer: [email or web address] French
 
Notice Issued Date-Time: 2019  11:30
Notice Type: Patient Choice Letter
 
Notice Delivered To: Patient
Relationship to Patient: 
Representative Name: 
 
Delivery Method: HAND - Hand Delivered
Jo Ann Days:
Prior Verbal Notification: 
 
Recipient Understood Notice: Yes
Recipient Signature: Yes
Med Rec Note Co-signed by Attending:
 
Coverage Notice Comment:  [facility]
Reviewer: [id number] French
 
Notice Issued Date-Time: 2019  11:50
Notice Type: IM Discharge Notice
 
Notice Delivered To: Patient
Relationship to Patient: 
Representative Name: 
 
Delivery Method: HAND - Hand Delivered
Jo Ann Days:
Prior Verbal Notification: 
 
Recipient Understood Notice: Yes
Recipient Signature: Yes
Med Rec Note Co-signed by Attending:
 
Coverage Notice Comment:  
 
Last DP export: 19   3:51 
Patient Name: DORINDA BENITEZ
 
Encounter #: [id number]
Page 19693
 
 
 
 
 
Electronically Signed by PATRICIA HSU on 19 at 1212
 
 
 
 
 
 
**All edits/amendments must be made on the electronic document**
 
DICTATION DATE: 19 121     : FOX  19 1212     
RPT#: [id number]                                DC DATE:        
                                               STATUS: ADM IN  
[facility]
[address] [place] [address]
***END OF REPORT***

## 2019-11-25 NOTE — MORECARE
CASE MANAGEMENT DISCHARGE SUMMARY
 
 
PATIENT: DORINDA BENITEZ                        UNIT: [id number]
ACCOUNT#: [id number]                       ADM DATE: 19
AGE: 54     : [birth year]  SEX: F            ROOM/BED: D.0536    
AUTHOR: ARACELIS,DOC                             PHYSICIAN:                               
 
REFERRING PHYSICIAN: POLY DOUGLAS MD            
DATE OF SERVICE: 19
Discharge Plan
 
 
Patient Name: DORINDA BENITEZ
Facility: [facility]
Encounter #: [id number]
Medical Record #: [id number]
: 1965
Planned Disposition: Skilled Nursing Facility
Anticipated Discharge Date: 19
 
Discharge Date: 
Expected LOS: 6
Initial Reviewer: [id number]
Initial Review Date: 2019
Generated: 19   1:19 pm 
Comments
 
DCP- Discharge Planning
 
Updated by [id number]: Edil French on 19  11:14 am CT
Patient Name:  DORINDA BENITEZ   
Encounter No:  [id number]   
:  1965   
Primary Insurance:  MEDICARE A & B  
Anticipated DC Date: 2019   
Planned Disposition:  Skilled Nursing Facility  
External Planned Provider:  [facility]  
  
  
DCP follow-up note: CM SPOKE TO PT IN ROOM, PT STILL WANTS REHAB AT [facility] AS SHE HAS BEEN THERE BEFORE.  IMPORTANT MESSAGE FROM MEDICARE 
PROVIDED AND EXPLAINED.  PT HAS PROSTHESIS WITH HER FOR THERAPY SERVICES.    
  
CM FAXED REFERRAL UPDATE TO [facility] -[phone]; CM CALLED DYLON 
AT [facility],  [phone]. [facility] WILL ACCEPT PT FOR REHAB 
TOMORROW MORNING, WILL TRANSPORT TO AND FROM OUTPATIENT DIALYSIS IN [place].  
CM NOTIFIED HAILEY WHALEY OF RENAL.    
  
FOR DISCHARGE TO REHAB AT [facility], FAX DISCHARGE INFORMATION TO 
 
[facility] -[phone]; NURSE REPORT TO BE CALLED TO [facility] 
[phone].  [facility] TO ARRANGE VAN TRANSPORTATION.  
  
NAOMI Crespo
DCP- Discharge Planning
 
Updated by [id number]: Zoe Vazquez on 19   3:44 pm CT
Patient Name: DORINDA BENITEZ                                     
Admission Status: Elective   
Accout number: [id number]                              
Admission Date: 2019   
: 1965                                                        
Admission Diagnosis:   
Attending: POLY DOUGLAS                                                
Current LOS:  2   
  
Anticipated DC Date: 2019   
Planned Disposition: Skilled Nursing Facility   
Primary Insurance: MEDICARE A & B   
  
  
Discharge Planning Comments: UPDATE FAXED TO [facility].   
  
  
  
  
  
  
: Zoe Vazquez
DCP- Discharge Planning
 
Updated by [email or web address]: Edil French on 19   3:29 pm CT
Patient Name:  DORINDA BENITEZ   
Encounter No:  [id number]   
:  1965   
Primary Insurance:  MEDICARE A & B  
Anticipated DC Date: 2019   
Planned Disposition:  Skilled Nursing Facility  
External Planned Provider: [facility] MEDICARE REHAB BED  
  
  
DCP follow-up note: CM RECEIVED ORDER FOR NURSING HOME PLACEMENT.  CM MET 
WITH PT IN ROOM, PT WANTS REHAB AT [facility] AS SHE HAS BEEN THERE 
BEFORE.  CHOICE SIGNED.  PT STATES SHE HAS A NEW PROSTHETHIC LEG AT HOME AND 
NEEDS REHAB TO BE ABLE TO WALK ON IT.  CM ADVISED THAT FAMILY NEEDS TO BRING 
THE LEG HERE FOR THERAPY TO EVALUATE AND WORK WITH HER AND THE NEW LEG.  
ORDER FOR PHYSICAL THERAPY EVALUATION OBTAINED.    
  
CM FAXED REFERRAL INFORMATION TO [facility] [phone]; CM CALLED 
DYLON AT [facility],  [phone]. WHO WILL EVALUATE PT FOR ADMISSION . 
  
CM TO FAX PHYSICAL THERAPY EVALUATION TO [facility] AND IS WAITING FOR 
 
ADMISSION DETERMINATION FROM [facility] FOR REHAB SERVICES.  
  
Edil French, CASE MANAGEMENT
DCP- Discharge Planning
 
Updated by [id number]: Edil French on 19   7:26 am CT
Patient Name: DORINDA BENITEZ                                     
Admission Status: Elective   
Accout number: [id number]                              
Admission Date: 2019   
: 1965                                                        
Admission Diagnosis:   
Attending: POLY DOUGLAS                                                
Current LOS:  1   
  
Anticipated DC Date:    
Planned Disposition: Home   
Primary Insurance: MEDICARE A & B   
  
  
Discharge Planning Comments:  LATE ENTRY FROM 19, 1645 HOURS:   
CM RECEIVED ORDER FOR IN VS OUT, TRANSPORTATION TO DIALYSIS AND 
TRANSPORTATION HOME.  CM MET WITH PT IN ROOM TO DISCUSS DISCHARGE PLANNING 
AND NEEDS. PT REPORTS LIVING AT HOME INDEPENDENTLY WITH HER ADULT DAUGHTER 
AND SISTER. PT HAS A CANE WITH NO MEDICAL EQUIPMENT PROVIDER PREFERENCE.  PT 
HAS PERSONAL CARE THROUGH MEDICAID; [employer] PAYS PT'S DAUGHTER FOR 
CAREGIVING, MONDAY THRU FRIDAY, 2 HOURS PER DAY.  PT GOES TO DIALYSIS IN 
[place], MWF, 1130, [facility] TRANSPORTATION BUS.  CM DISCUSSED 
AVAILABILITY OF HOME HEALTH, REHAB SERVICES AND MEDICAL EQUIPMENT. PT STATES 
SHE PLANS TO CONTINUE GOING TO THE [facility] WHERE SHE HAS BEEN DOING 
OUTPATIENT THERAPY.  PT STATES IF SHE NEEDS NURSING HOME REHAB, SHE WANTS 
[facility].  PT PLANS TO RETURN HOME, DENIES DISCHARGE NEEDS, REPORTS 
HER DAUGHTER WILL PICK HER UP FOR DISCHARGE HOME.  PT STATES HAVING MEDICAID 
BUS SET UP FOR DIALYSIS TRANSPORTATION FROM HOME.  
  
PT PLANS TO DISCHARGE HOME WITH FAMILY, DAUGHTER TO TRANSPORT HOME.  PT HAS 
NO ANTICIPATED DISCHARGE NEEDS AT TIME OF ASSESSMENT.   
  
: Edil French
 DCPIA - Discharge Planning Initial Assessment
 
Updated by [id number]: Edil French on 19   8:22 am
*  Is the patient Alert and Oriented?
Yes
*  How many steps to enter\exit or inside your home? NONE *  PCP DR. MADISON HAYES * 
Pharmacy
[facility] IN [place]
*  Preadmission Environment
Home with Family
*  ADLs
Independent
*  Equipment
 
Cane
*  Other Equipment
NO MEDICAL EQUIPMENT PROVIDER PREFERENCE
*  List name and contact numbers for known caregivers / representatives who 
currently or will assist patient after discharge:
TASNEEM MCKEON, DTR, [phone]  NALINI SHAHID, SISTER, [phone]
 
*  Verbal permission to speak to the caregivers and representatives has been 
obtained from the patient.
N/A
*  Community resources currently utilized
Other
Private Duty Care
*  Please name any agencies selected above.
OUTPATIENT DIALYSIS, [place], F, 1130, [employer] TRANSPORTATION  
DAUGHTGER IS PAID CAREGIVER THROUGH [facility], -, 2 HOURS PER DAY
*  Additional services required to return to the preadmission environment?
No
*  Can the patient safely return to the preadmission environment?
Yes
*  Has this patient been hospitalized within the prior 30 days at any 
hospital?
No
 
 
 
 
 
Coverage Notice
 
Reviewer: [email or web address] Mylene French
 
Notice Issued Date-Time: 2019  11:30
Notice Type: Patient Choice Letter
 
Notice Delivered To: Patient
Relationship to Patient: 
Representative Name: 
 
Delivery Method: HAND - Hand Delivered
Jo Ann Days:
Prior Verbal Notification: 
 
Recipient Understood Notice: Yes
Recipient Signature: Yes
Med Rec Note Co-signed by Attending:
 
Coverage Notice Comment:  [facility]
Reviewer: [email or web address] Mylene French
 
Notice Issued Date-Time: 2019  11:50
Notice Type: IM Discharge Notice
 
 
Notice Delivered To: Patient
Relationship to Patient: 
Representative Name: 
 
Delivery Method: HAND - Hand Delivered
Jo Ann Days:
Prior Verbal Notification: 
 
Recipient Understood Notice: Yes
Recipient Signature: Yes
Med Rec Note Co-signed by Attending:
 
Coverage Notice Comment:  
 
Last DP export: 19  11:12 
Patient Name: DORINDA BENITEZ
Encounter #: [id number]
Page [id number]
 
 
 
 
 
Electronically Signed by PATRICIA HSU on 19 at 1220
 
 
 
 
 
 
**All edits/amendments must be made on the electronic document**
 
DICTATION DATE: 19     : FOX  19     
RPT#: [id number]                                DC DATE:        
                                               STATUS: ADM IN  
[facility]
[address] [place] [address]
***END OF REPORT***

## 2019-11-25 NOTE — NUR
PATIENT CAME BACK FROM DIALYSIS AND SHE IS HUNGRY . SHE DID NOT EAT BREAKFAST,
AND SO SHE IS GETTING AN EXTRA PORKCHOP. THEY TOOK ONE LITER OFF IN DIALYSIS.

## 2019-11-26 VITALS — DIASTOLIC BLOOD PRESSURE: 67 MMHG | SYSTOLIC BLOOD PRESSURE: 122 MMHG

## 2019-11-26 VITALS — SYSTOLIC BLOOD PRESSURE: 133 MMHG | DIASTOLIC BLOOD PRESSURE: 63 MMHG

## 2019-11-26 VITALS — DIASTOLIC BLOOD PRESSURE: 68 MMHG | SYSTOLIC BLOOD PRESSURE: 127 MMHG

## 2019-11-26 LAB
ALBUMIN SERPL-MCNC: 2.9 G/DL (ref 3.4–5)
ALP SERPL-CCNC: 96 U/L (ref 46–116)
ALT SERPL-CCNC: 14 U/L (ref 10–68)
ANION GAP SERPL CALC-SCNC: 14.2 MMOL/L (ref 8–16)
BASOPHILS NFR BLD AUTO: 0.4 % (ref 0–2)
BILIRUB SERPL-MCNC: 0.21 MG/DL (ref 0.2–1.3)
BUN SERPL-MCNC: 48 MG/DL (ref 7–18)
CALCIUM SERPL-MCNC: 8.5 MG/DL (ref 8.5–10.1)
CHLORIDE SERPL-SCNC: 102 MMOL/L (ref 98–107)
CO2 SERPL-SCNC: 25.8 MMOL/L (ref 21–32)
CREAT SERPL-MCNC: 5.6 MG/DL (ref 0.6–1.3)
EOSINOPHIL NFR BLD: 8.8 % (ref 0–7)
ERYTHROCYTE [DISTWIDTH] IN BLOOD BY AUTOMATED COUNT: 15.2 % (ref 11.5–14.5)
GLOBULIN SER-MCNC: 3.8 G/L
GLUCOSE SERPL-MCNC: 88 MG/DL (ref 74–106)
HCT VFR BLD CALC: 31.2 % (ref 36–48)
HGB BLD-MCNC: 9.9 G/DL (ref 12–16)
IMM GRANULOCYTES NFR BLD: 0.4 % (ref 0–5)
LYMPHOCYTES NFR BLD AUTO: 38.2 % (ref 15–50)
MCH RBC QN AUTO: 31.8 PG (ref 26–34)
MCHC RBC AUTO-ENTMCNC: 31.7 G/DL (ref 31–37)
MCV RBC: 100.3 FL (ref 80–100)
MONOCYTES NFR BLD: 11.4 % (ref 2–11)
NEUTROPHILS NFR BLD AUTO: 40.8 % (ref 40–80)
OSMOLALITY SERPL CALC.SUM OF ELEC: 283 MOSM/KG (ref 275–300)
PLATELET # BLD: 219 10X3/UL (ref 130–400)
PMV BLD AUTO: 10.2 FL (ref 7.4–10.4)
POTASSIUM SERPL-SCNC: 6 MMOL/L (ref 3.5–5.1)
PROT SERPL-MCNC: 6.7 G/DL (ref 6.4–8.2)
RBC # BLD AUTO: 3.11 10X6/UL (ref 4–5.4)
SODIUM SERPL-SCNC: 136 MMOL/L (ref 136–145)
WBC # BLD AUTO: 5 10X3/UL (ref 4.8–10.8)

## 2019-11-26 NOTE — OP
PATIENT NAME:  ELIANA AYALA                              MEDICAL RECORD: [id number]
:[birth year]                                             LOCATION:D.     D.2135
                                                         ADMISSION DATE:19
SURGEON:  JOVANA OSORIO MD            
 
 
DATE OF OPERATION:  2019
 
REFERRED BY:  Dashawn Chaudhry MD
 
PREOPERATIVE DIAGNOSES:  End-stage renal disease and dependence on hemodialysis
with loss of upper extremity dialysis access.
 
ADDITIONAL DIAGNOSES:  Hypertension, diabetes, peripheral atherosclerotic
arterial disease status post left below knee amputation.
 
POSTOPERATIVE DIAGNOSES:  End-stage renal disease and dependence on hemodialysis
with loss of upper extremity dialysis access.
 
OPERATION PERFORMED:  Ultrasound-guided access and performance of a superior
vena cavogram and angioplasty of 90% stenosis of the right subclavian vein
followed by implantation of an Artegraft loop AV graft in the right arm between
the proximal brachial artery and proximal basilic vein with completion superior
vena cavogram and right brachial arteriogram.
 
SURGEON:  Jovana Osorio MD
 
ANESTHESIA:  General per CRNA.
 
PREOPERATIVE NOTE:  Ms. Eliana Ayala is a 54-year-old -American female with
end-stage renal disease, who is on chronic hemodialysis in [place].  At this
time, she has a femoral catheter.  She has had a femoral catheter for several
months.  She has a history of MRSA staphylococcal infections and bacteremia also
along with her diabetes and hypertension, peripheral atherosclerotic vascular
disease.  She has no internal jugular veins available for dialysis catheter
insertion.  I had seen earlier this year and she had actually been scheduled to
come in to have surgery for creation of an access in her right arm hopefully
back in the very early September.  She has been a no show and has had
hospitalization for complications, which delayed her having the procedure she is
brought to the hospital for today.
 
DESCRIPTION OF PROCEDURE:  Under general anesthesia, she was prepped and draped
in a sterile manner with the right arm abducted.  I used ultrasound to examine
the vessels in the axilla and upper arm and then used ultrasound guidance and
micropuncture technique to place a wire and catheter in the proximal basilic and
axillary vein.  Hard copy images were printed and to document the ultrasound
they are placed in the patient's medical record.
 
After inserting a 5-Lebanese catheter percutaneously via the basilic vein, I
performed a superior vena cavogram.  Imaging the proximal basilic and the
axillary subclavian innominate veins and superior vena cava, I found a 90%
stenosis of the right subclavian vein.  I then exchanged the small catheter for
a 6-Lebanese introducer and advanced a guidewire and glide catheter proximally and
I was able to cross the stenosis and pass the guidewire down into the inferior
vena cava.  I then inserted a 12-mm Freeland balloon and dilated the stenotic
subclavian vein.  The stenosis was somewhat fibrotic and was dilated only after
several inflations and holding the balloon inflated for up to 1 minute. 
However, we did achieve full effacement of the balloon and on repeat contrast
 
 
 
OPERATIVE REPORT                               [id number]    ELIANA AYALA            
 
 
injection there was full resolution with 0% residual stenosis.  I felt that we
could then go on to do a right upper extremity AV graft and I made an incision
on the inside of the arm and exposed the basilic vein where the introducer had
been inserted and sutured the puncture site.  The basilic vein was controlled
just above that with Silastic loops and prepared for anastomosis and the
brachial artery at that level high in the arm was exposed and controlled with
Silastic loops also.  I chose an Artegraft prosthetic and it was rinsed and
prepared as per the 's instructions.  One end was bevelled and the
vein was opened, flushed with heparinized saline and the graft anastomosed to
it, end of graft to side of vein with running 6-0 Prolene.  The suture line was
treated with Evicel and when the graft was flushed with saline the suture line
was watertight.  A counterincision was made on the distal arm and the graft
brought from the proximal incision down to the distal incision and then through
a very superficial subcutaneous tunnel using an acutely curved Impra tunneler. 
The graft was brought back to the upper incision and vicinity of the brachial
artery where it was shortened and bevelled, and again flushed with heparinized
saline.  The artery was occluded and then opened and flushed proximally and
distally with heparinized saline.  The artery was quite atherosclerotic with
very thickened walls, but smooth lumen.  The artery was flushed with heparinized
saline and the graft then anastomosed end of graft to side of artery with
continuous running 6-0 Prolene.  When the occluding clamps and loops were
released, excellent flow with a strong thrill and pulsation developed
immediately within the patient's graft.  I then noted on Doppler examination
that there was pulsatile Doppler flow in the radial artery at the wrist, but it
was monophasic and of much less amplitude when the graft was opened than when it
was clamped.  I performed a completion venogram then in accessing the basilic
vein through the graft with micropuncture catheter and this study shows an
excellent result with 0% persistence of stenosis in that vessel.  Similarly, I
performed a brachial artery arteriogram by passing a 5-Lebanese catheter through
the graft, retrograde into the brachial artery.  Contrast was injected and
digital subtraction technique used to image the vessels to the distal forearm. 
The brachial artery was small, but patent.  It bifurcates in the proximal radial
and ulnar arteries are normal and clear, but then both taper very dramatically
and there are very small vessels in the distal forearm and very poor flow into
the distal forearm and into the hand.  This study was with the graft clamped.  I
felt the patient very likely to have ischemic complications and so I went ahead
and did a banding for flow reduction with a Lopez banding technique.  I
inflated a 4-mm angioplasty balloon and held it up against the side of the
juxta-anastomotic segment of graft and then encircled the graft and balloon with
two 2-0 Prolene ties.  These were tied down snugly over the balloon to create as
close as possible a 4-mm diameter lumen of the graft in that segment.  When the
balloon was deflated and removed, flow resumed within the graft, but the thrill
was lessened and the pulsation not as strong.  This, I thought was as good as we
could do at this point.  The wound was irrigated with saline and infiltrated
with 0.25% Marcaine without epinephrine.  No drain was used.  Both wounds were
closed with interrupted inverted 3-0 Vicryl and running intracuticular 4-0
Stratafix and Dermabond glue.  They were dressed with Maxorb Ag, Tegaderm, and
Cavilon skin prep.  The patient awakened from her anesthetic, was then taken to
the recovery room.  Blood loss during the operation was about 50 cc, was
unreplaced, probably less.  Sponges, instruments and needles were accounted for.
 No drain was used and no surgical specimen was submitted for histopathology.
 
In the recovery room, the patient had a brief run of V-tach and I think needs to
be observed in the hospital overnight.  In addition, she has no transportation
to home in all likelihood, and as her daughter dropped her off this morning and
 
 
 
OPERATIVE REPORT                               [id number]    ELIANA AYALA            
 
 
has not been back and the patient had expectations that she would stay in the
hospital, although I do not know why, I did not tell her that preop.  However, I
believe that she is somewhat at risk for bleeding and certainly at risk for
ischemic symptoms in her hand and I will feel better if she is observed in the
hospital overnight for those reasons as well.  Hopefully, she can have
transportation in the morning and be discharged to go back to [place] for
dialysis tomorrow morning.
 
The patient's records show that she has been on Coumadin in the past and has a
history of DVT.  She denies, however, having been on Coumadin at any time
recently.  My records just said that she was supposed to be off Coumadin for 5
days preop.  We will need to have her primary nephrologist, Dr. Chaudhry, and
cardiologist, Dr. Soriano, declare she is supposed to be or should continue on
Coumadin.  I would like to see her at least on aspirin.
 
TRANSINT:[id number] Voice Confirmation ID: [id number] DOCUMENT ID: [id number]
cc: [facility] 
                                           
                                           JOVANA OSORIO MD            
 
 
 
Electronically Signed by JOVANA OSORIO on 19 at 0737
 
 
 
 
 
 
 
 
 
 
 
 
 
 
 
 
 
 
 
 
 
 
 
 
CC: DASHAWN CHAUDHRY MD and JONH SORIANO                        [id number]
DICTATION DATE: 19 1424     :     19 1511      ADM IN  
                                                                              
[facility]                                          
[address] [place] [address]

## 2019-11-26 NOTE — NUR
DISCHARGE TEACHING IS BEING WORKED ON. I READ ALL HER INSTRUCTIONS OUT LOUD.
PATIENT REPORTS THAT SHE IS HOPING FOR A CHANGE IN HER PAIN MEDICATIONS AND I
LET HER KNOW THAT SHE NEEDS TO BRING IT UP WITH HER DR IN DIALYSIS TOMORROW.
SHE IS GETTING DRESSED AND CLEANED UP NOW. TELEMETRY IS BEING REMOVED AND
RETURNED.

## 2019-11-26 NOTE — NUR
PATIENT IS BEING DISCHARGED. APPLIED NEW MEPLEX TO HER BUTTOCK. THERE IS A
SMALL PATCH OF RED SKIN THERE. CALLING AND GIVING REPORT NOW TO [facility] IN [place]. SHE WILL BE TRANSPORTED TO [place] FOR
DIALYSIS. SHE IS DRESSED AND READY FOR DISCHARGE.

## 2019-11-26 NOTE — MORECARE
CASE MANAGEMENT DISCHARGE SUMMARY
 
 
PATIENT: DORINDA BENITEZ                        UNIT: [id number]
ACCOUNT#: [id number]                       ADM DATE: 19
AGE: 54     : [birth year]  SEX: F            ROOM/BED: D.1349    
AUTHOR: ARACELIS,DOC                             PHYSICIAN:                               
 
REFERRING PHYSICIAN: POLY DOUGLAS MD            
DATE OF SERVICE: 19
Discharge Plan
 
 
Patient Name: DORINDA BENITEZ
Facility: [facility]:[place]
Encounter #: [id number]
Medical Record #: [id number]
: 1965
Planned Disposition: Skilled Nursing Facility
Anticipated Discharge Date: 19
 
Discharge Date: 
Expected LOS: 6
Initial Reviewer: [id number]
Initial Review Date: 2019
Generated: 19  10:31 am 
Comments
 
DCP- Discharge Planning
 
Updated by [id number]: Edil French on 19   8:31 am CT
Patient Name:  DORINDA BENITEZ   
Encounter No:  [id number]   
:  1965   
Primary Insurance:  MEDICARE A & B  
Anticipated DC Date: 2019   
Planned Disposition:  Skilled Nursing Facility  
External Planned Provider:[facility], MEDICARE REHAB BED  
  
  
DCP follow-up note: CM RECEIVED DISCHARGE ORDERS, FAXED DISCHARGE INFORMATION 
TO [facility] -[phone].  
  
NURSE REPORT TO BE CALLED TO [facility] [phone].  [facility] TO ARRANGE VAN TRANSPORTATION.  
  
NAOMI Crespo
DCP- Discharge Planning
 
Updated by [id number]: Edil French on 19  11:14 am CT
 
Patient Name:  DORINDA BENITEZ   
Encounter No:  [id number]   
:  1965   
Primary Insurance:  MEDICARE A & B  
Anticipated DC Date: 2019   
Planned Disposition:  Skilled Nursing Facility  
External Planned Provider:  [facility], MEDICARE REHAB BED  
  
  
DCP follow-up note: CM SPOKE TO PT IN ROOM, PT STILL WANTS REHAB AT [facility] AS SHE HAS BEEN THERE BEFORE.  IMPORTANT MESSAGE FROM MEDICARE 
PROVIDED AND EXPLAINED.  PT HAS PROSTHESIS WITH HER FOR THERAPY SERVICES.    
  
CM FAXED REFERRAL UPDATE TO [facility] [phone]; CM CALLED DYLON 
AT [facility],  [phone]. [facility] WILL ACCEPT PT FOR REHAB 
TOMORROW MORNING, WILL TRANSPORT TO AND FROM OUTPATIENT DIALYSIS IN [place].  
CM NOTIFIED HORTENSIAWILL JOVANA OF RENAL.    
  
FOR DISCHARGE TO REHAB AT [facility], FAX DISCHARGE INFORMATION TO 
[facility] -[phone]; NURSE REPORT TO BE CALLED TO [facility] 
[phone].  [facility] TO ARRANGE VAN TRANSPORTATION.  
  
Edil French CASE MANAGEMENT
DCP- Discharge Planning
 
Updated by [email or web address]: Zoe Vazquez on 19   3:44 pm CT
Patient Name: DORINDA BENITEZ                                     
Admission Status: Elective   
Accout number: [id number]                              
Admission Date: 2019   
: 1965                                                        
Admission Diagnosis:   
Attending: POLY DOUGLAS                                                
Current LOS:  2   
  
Anticipated DC Date: 2019   
Planned Disposition: Skilled Nursing Facility   
Primary Insurance: MEDICARE A & B   
  
  
Discharge Planning Comments: UPDATE FAXED TO [facility].   
  
  
  
  
  
  
: Zoe Vazquez
DCP- Discharge Planning
 
Updated by [email or web address]: Edil French on 19   3:29 pm CT
Patient Name:  DORINDA BENITEZ   
 
Encounter No:  [id number]   
:  1965   
Primary Insurance:  MEDICARE A & B  
Anticipated DC Date: 2019   
Planned Disposition:  Skilled Nursing Facility  
External Planned Provider: [facility], MEDICARE REHAB BED  
  
  
DCP follow-up note: CM RECEIVED ORDER FOR NURSING HOME PLACEMENT.  CM MET 
WITH PT IN ROOM, PT WANTS REHAB AT [facility] AS SHE HAS BEEN THERE 
BEFORE.  CHOICE SIGNED.  PT STATES SHE HAS A NEW PROSTHETHIC LEG AT HOME AND 
NEEDS REHAB TO BE ABLE TO WALK ON IT.  CM ADVISED THAT FAMILY NEEDS TO BRING 
THE LEG HERE FOR THERAPY TO EVALUATE AND WORK WITH HER AND THE NEW LEG.  
ORDER FOR PHYSICAL THERAPY EVALUATION OBTAINED.    
  
CM FAXED REFERRAL INFORMATION TO [facility] [phone]; CM CALLED 
DYLON AT [facility],  [phone]. WHO WILL EVALUATE PT FOR ADMISSION . 
  
CM TO FAX PHYSICAL THERAPY EVALUATION TO [facility] AND IS WAITING FOR 
ADMISSION DETERMINATION FROM [facility] FOR REHAB SERVICES.  
  
Edil French, CASE MANAGEMENT
DCP- Discharge Planning
 
Updated by [id number]: Edil French on 19   7:26 am CT
Patient Name: DORINDA BENITEZ                                     
Admission Status: Elective   
Accout number: [id number]                              
Admission Date: 2019   
: 1965                                                        
Admission Diagnosis:   
Attending: POLY DOUGLAS                                                
Current LOS:  1   
  
Anticipated DC Date:    
Planned Disposition: Home   
Primary Insurance: MEDICARE A & B   
  
  
Discharge Planning Comments:  LATE ENTRY FROM 19, 1645 HOURS:   
CM RECEIVED ORDER FOR IN VS OUT, TRANSPORTATION TO DIALYSIS AND 
TRANSPORTATION HOME.  CM MET WITH PT IN ROOM TO DISCUSS DISCHARGE PLANNING 
AND NEEDS. PT REPORTS LIVING AT HOME INDEPENDENTLY WITH HER ADULT DAUGHTER 
AND SISTER. PT HAS A CANE WITH NO MEDICAL EQUIPMENT PROVIDER PREFERENCE.  PT 
HAS PERSONAL CARE THROUGH MEDICAID; [employer] PAYS PT'S DAUGHTER FOR 
CAREGIVING, MONDAY THRU FRIDAY, 2 HOURS PER DAY.  PT GOES TO DIALYSIS IN 
[place], [facility], [address]SPORT[address].  CM DISCUSSED 
AVAILABILITY OF HOME HEALTH, REHAB SERVICES AND MEDICAL EQUIPMENT. PT STATES 
SHE PLANS TO CONTINUE GOING TO THE [facility] WHERE SHE HAS BEEN DOING 
OUTPATIENT THERAPY.  PT STATES IF SHE NEEDS NURSING HOME REHAB, SHE WANTS 
[facility].  PT PLANS TO RETURN HOME, DENIES DISCHARGE NEEDS, REPORTS 
HER DAUGHTER WILL PICK HER UP FOR DISCHARGE HOME.  PT STATES HAVING MEDICAID 
 
BUS SET UP FOR DIALYSIS TRANSPORTATION FROM HOME.  
  
PT PLANS TO DISCHARGE HOME WITH FAMILY, DAUGHTER TO TRANSPORT HOME.  PT HAS 
NO ANTICIPATED DISCHARGE NEEDS AT TIME OF ASSESSMENT.   
  
: Edil French
 DCPIA - Discharge Planning Initial Assessment
 
Updated by [id number]: Edil French on 19   8:22 am
*  Is the patient Alert and Oriented?
Yes
*  How many steps to enter\exit or inside your home? NONE *  PCP DR. MADISON HAYES * 
Pharmacy
[facility] IN [place]
*  Preadmission Environment
Home with Family
*  ADLs
Independent
*  Equipment
Cane
*  Other Equipment
NO MEDICAL EQUIPMENT PROVIDER PREFERENCE
*  List name and contact numbers for known caregivers / representatives who 
currently or will assist patient after discharge:
TASNEEM MCKEON, DTR, [phone]  NALINI SHAHID, SISTER, [phone]
 
*  Verbal permission to speak to the caregivers and representatives has been 
obtained from the patient.
N/A
*  Community resources currently utilized
Other
Private Duty Care
*  Please name any agencies selected above.
OUTPATIENT DIALYSIS, [place], [address], [address] [employer] TRANSPORT[facility]  
DAUGHTGER IS PAID CAREGIVER THROUGH [employer], M-F, 2 HOURS PER DAY
*  Additional services required to return to the preadmission environment?
No
*  Can the patient safely return to the preadmission environment?
Yes
*  Has this patient been hospitalized within the prior 30 days at any 
hospital?
No
 
 
 
 
 
Coverage Notice
 
Reviewer: [id number] Mylene French
 
Notice Issued Date-Time: 2019  11:30
 
Notice Type: Patient Choice Letter
 
Notice Delivered To: Patient
Relationship to Patient: 
Representative Name: 
 
Delivery Method: HAND - Hand Delivered
Jo Ann Days:
Prior Verbal Notification: 
 
Recipient Understood Notice: Yes
Recipient Signature: Yes
Med Rec Note Co-signed by Attending:
 
Coverage Notice Comment:  JAYLA [place]
Reviewer: [id number] Mylene French
 
Notice Issued Date-Time: 2019  11:50
Notice Type: IM Discharge Notice
 
Notice Delivered To: Patient
Relationship to Patient: 
Representative Name: 
 
Delivery Method: HAND - Hand Delivered
Jo Ann Days:
Prior Verbal Notification: 
 
Recipient Understood Notice: Yes
Recipient Signature: Yes
Med Rec Note Co-signed by Attending:
 
Coverage Notice Comment:  
 
Last DP export: 19  11:20 
Patient Name: DORINDA BENITEZ
 
Encounter #: [id number]
Page [id number]
 
 
 
 
 
Electronically Signed by PATRICIA HSU on 19 at 0932
 
 
 
 
 
 
**All edits/amendments must be made on the electronic document**
 
DICTATION DATE: 19     : FOX  19     
RPT#: [id number]                                DC DATE:        
                                               STATUS: ADM IN  
[facility]
[address] [place] [address]
***END OF REPORT***

## 2019-11-26 NOTE — EC
PATIENT:DORINDA BENITEZ                    DATE OF SERVICE: 11/20/19
SEX: F                                  MEDICAL RECORD: [id number]
DATE OF BIRTH: [birth year]                        LOCATION:D.M2      D.213
AGE OF PATIENT: 54                             ADMISSION DATE: 11/20/19
 
REFERRING PHYSICIAN:                               
 
INTERPRETING PHYSICIAN: MANNIE VALENCIA MD          
 
 
 
                             ECHOCARDIOGRAM REPORT
  ECHO CHARGES 4               ECHO COMPLETE                 Date: 11/20/19
 
 
 
CLINICAL DIAGNOSIS: V-TACH                        
 
                         ECHOCARDIOGRAPHIC MEASUREMENTS
      (adult normal given)
   AC root (d.<3.7cm) 3.2  cm   LV Septum d (<1.2 cm> 1.4  cm
      Valve Excursion 2.0  cm     LV Septum (systole) 1.6  cm
Left Atria (s.<4.0cm> 4.0  cm          LVPW d(<1.2cm) 1.4  cm
        RV (d.<2.3cm) 2.4  cm           LVPW (sytole) 2.0  cm
  LV diastole(<5.6CM) 3.9  cm       MV E-F(>70mm/sec)      cm
           LV systole 1.6  cm           LVOT Diameter 1.8  cm
       MV exc.(>10mm)      cm
Est.ejection fraction (50-75%)     %
 
   DOPPLER:
     LVIT      cm/sec A 116  cm/sec E 126   cm/sec
       LA      cm/sec      RVSP 36.0 mmHg
     LVOT 93.0 cm/sec   AOP1/2T      m/s
  Asc. Ao 167  cm/sec
     RVOT 47.0 cm/sec
       RA      cm/sec
         cm/sec
 AV Gradient Peak 11.2 mmHg  AV Mean 5.5  mmHg  AV Area 1.6  cm
 MV Gradient Peak 6.6  mmHg  MV Mean 2.4  mmHg  MV Area      cm
   COMMENTS:                                              
 
 
 Cardiac Sonographer: Jose Miguel ENCARNACIONOE            
      Cardiologist: 3          Dr. Laurent             
             TAPE# PACS           
                                       Pericardial Effusion N                        
 
 
DATE OF SERVICE:  
 
Adequate 2D, color flow, spectral Doppler, and M-mode.  LVH is present.  LV
internal dimensions are normal.  Wall motion is normal.  EF is greater than or
equal to 55%.  Aortic valve is sclerotic.  There is no evidence of stenosis by
Doppler interrogation.  Left atrium is normal at 4.0 cm.  Mitral valve shows no
prolapse.  Trace MR.  Right-sided chambers are grossly normal.  Mild plus TR by
color flow imaging.
 
TRANSINT:[id number] Voice Confirmation ID: [id number] DOCUMENT ID: [id number]
 
 
 
ECHOCARDIOGRAM REPORT                          [id number]    DORINDA BENITEZ GREGORY A MD          
 
 
 
Electronically Signed by MANNIE VALENCIA on 11/26/19 at 0848
 
 
 
 
 
 
 
 
 
 
 
 
 
 
 
 
 
 
 
 
 
 
 
 
 
 
 
 
 
 
 
 
 
 
 
 
 
 
 
 
CC:                                                             [id number]
DICTATION DATE: 11/21/19 1410     :     11/21/19 1545      ADM IN  
                                                                              
[place] [id number] [place] [id number]

## 2019-11-26 NOTE — NUR
PATIENT WILL BE PICKED UP IN 45 MIN . ALL HER PERSONAL BELONGINGS HAVE BEEN
PACKED UP AND SHE IS READY TO GO.

## 2019-11-27 NOTE — DS
PATIENT:DORINDA AYALA                      :[birth year]   MEDICAL RECORD: [id number]
 
                              DISCHARGE SUMMARY
                                                         
ADMISSION DATE:    19                       DISCHARGE DATE:     19
 
 
HISTORY OF PRESENT ILLNESS:  Ms. Ayala is a 54-year-old black female with
end-stage renal disease, whom I have seen in [facility].  She was admitted
following an elective outpatient placement of a right upper Reno-Florentin graft by
Dr. Murguia, but had a cardiac arrhythmia, felt to represent a V-tach in the
recovery room and was admitted for the above.
 
HOSPITAL COURSE:  The patient was seen by cardiology, where she had no further
arrhythmia.  Echocardiogram was essentially negative and Dr. Laurent did not
feel that it was a run of V-tach.  She had an abnormal chest x-ray.  A CT of the
chest did not confirm any evidence of mass as suspicioned on chest x-ray.  She
then requested nursing home placement and will be discharged to the nursing home
today with her graft healing nicely.
 
DISCHARGE DIAGNOSES:
1.  Cardiac arrhythmia postop, resolved.
2.  Abnormal chest x-ray with negative CT of the chest.
3.  Status post placement of right upper Reno-Florentin graft for dialysis access.
4.  Chronic anemia.
5.  Poor social situation.
6.  History of drug abuse.
 
PLAN:  The patient will be discharged to the nursing home today.  She will
resume her dialysis in [facility].  We will see her weekly in [facility].
 
DISCHARGE MEDICATIONS:  Will be Veltassa 8.5 grams daily, Senokot 1 daily,
MiraLax p.r.n., hydrocodone 5/325 one every 8 hours p.r.n., Seroquel 25 at
bedtime.  She will receive Epogen in dialysis, Nephro-Yadira 1 daily, isosorbide
60 daily, Renvela 800 t.i.d., metoprolol 100 b.i.d., Neurontin 300 t.i.d.,
Effexor 150 daily.
 
TRANSINT:[id number] Voice Confirmation ID: [id number] DOCUMENT ID: [id number]
                                           
                                           DASHAWN COLLINS MD            
 
 
 
Electronically Signed by DASHAWN COLLINS on 19 at 0744
 
 
 
 
 
CC:                                                             [id number]
DICTATION DATE: 19 08     :     19 0835      DIS IN  
                                                                      19
[facility]                                          
 [place] [address]

## 2021-03-10 ENCOUNTER — HOSPITAL ENCOUNTER (OUTPATIENT)
Dept: HOSPITAL 84 - D.ER | Age: 56
Setting detail: OBSERVATION
LOS: 1 days | Discharge: ANOTHER HOSPITAL | End: 2021-03-11
Attending: FAMILY MEDICINE | Admitting: FAMILY MEDICINE
Payer: MEDICARE

## 2021-03-10 VITALS — DIASTOLIC BLOOD PRESSURE: 77 MMHG | SYSTOLIC BLOOD PRESSURE: 152 MMHG

## 2021-03-10 VITALS — SYSTOLIC BLOOD PRESSURE: 142 MMHG | DIASTOLIC BLOOD PRESSURE: 86 MMHG

## 2021-03-10 VITALS — SYSTOLIC BLOOD PRESSURE: 160 MMHG | DIASTOLIC BLOOD PRESSURE: 70 MMHG

## 2021-03-10 VITALS
BODY MASS INDEX: 20.19 KG/M2 | WEIGHT: 118.25 LBS | HEIGHT: 64 IN | WEIGHT: 118.25 LBS | BODY MASS INDEX: 20.19 KG/M2 | HEIGHT: 64 IN

## 2021-03-10 VITALS — DIASTOLIC BLOOD PRESSURE: 73 MMHG | SYSTOLIC BLOOD PRESSURE: 153 MMHG

## 2021-03-10 VITALS — SYSTOLIC BLOOD PRESSURE: 129 MMHG | DIASTOLIC BLOOD PRESSURE: 63 MMHG

## 2021-03-10 VITALS — SYSTOLIC BLOOD PRESSURE: 147 MMHG | DIASTOLIC BLOOD PRESSURE: 70 MMHG

## 2021-03-10 VITALS — DIASTOLIC BLOOD PRESSURE: 54 MMHG | SYSTOLIC BLOOD PRESSURE: 120 MMHG

## 2021-03-10 DIAGNOSIS — F32.9: ICD-10-CM

## 2021-03-10 DIAGNOSIS — J44.9: ICD-10-CM

## 2021-03-10 DIAGNOSIS — R45.851: ICD-10-CM

## 2021-03-10 DIAGNOSIS — M79.662: ICD-10-CM

## 2021-03-10 DIAGNOSIS — G89.29: ICD-10-CM

## 2021-03-10 DIAGNOSIS — L40.9: ICD-10-CM

## 2021-03-10 DIAGNOSIS — N18.6: ICD-10-CM

## 2021-03-10 DIAGNOSIS — I12.0: Primary | ICD-10-CM

## 2021-03-10 DIAGNOSIS — H54.7: ICD-10-CM

## 2021-03-10 LAB
ALBUMIN SERPL-MCNC: 3.6 G/DL (ref 3.4–5)
ALP SERPL-CCNC: 312 U/L (ref 30–120)
ALT SERPL-CCNC: 21 U/L (ref 10–68)
ANION GAP SERPL CALC-SCNC: 17.8 MMOL/L (ref 8–16)
BASOPHILS NFR BLD AUTO: 0.8 % (ref 0–2)
BILIRUB SERPL-MCNC: 0.26 MG/DL (ref 0.2–1.3)
BUN SERPL-MCNC: 51 MG/DL (ref 7–18)
CALCIUM SERPL-MCNC: 7.8 MG/DL (ref 8.5–10.1)
CHLORIDE SERPL-SCNC: 102 MMOL/L (ref 98–107)
CO2 SERPL-SCNC: 24.5 MMOL/L (ref 21–32)
CREAT SERPL-MCNC: 7.8 MG/DL (ref 0.6–1.3)
EOSINOPHIL NFR BLD: 5.7 % (ref 0–7)
ERYTHROCYTE [DISTWIDTH] IN BLOOD BY AUTOMATED COUNT: 13.6 % (ref 11.5–14.5)
ETHANOL SERPL-MCNC: 0 MG/DL (ref 0–10)
GLOBULIN SER-MCNC: 4.4 G/L
GLUCOSE SERPL-MCNC: 200 MG/DL (ref 74–106)
HCT VFR BLD CALC: 30.7 % (ref 36–48)
HGB BLD-MCNC: 10.2 G/DL (ref 12–16)
IMM GRANULOCYTES NFR BLD: 0.2 % (ref 0–5)
LYMPHOCYTES # BLD: 1.75 10X3/UL (ref 1.18–3.74)
LYMPHOCYTES NFR BLD AUTO: 26.4 % (ref 15–50)
MCH RBC QN AUTO: 34.2 PG (ref 26–34)
MCHC RBC AUTO-ENTMCNC: 33.2 G/DL (ref 31–37)
MCV RBC: 103 FL (ref 80–100)
MONOCYTES NFR BLD: 9.7 % (ref 2–11)
NEUTROPHILS # BLD: 3.79 10X3/UL (ref 1.56–6.13)
NEUTROPHILS NFR BLD AUTO: 57.2 % (ref 40–80)
OSMOLALITY SERPL CALC.SUM OF ELEC: 298 MOSM/KG (ref 275–300)
PLATELET # BLD: 269 10X3/UL (ref 130–400)
PMV BLD AUTO: 9.6 FL (ref 7.4–10.4)
POTASSIUM SERPL-SCNC: 4.3 MMOL/L (ref 3.5–5.1)
PROT SERPL-MCNC: 8 G/DL (ref 6.4–8.2)
RBC # BLD AUTO: 2.98 10X6/UL (ref 4–5.4)
SARS-COV+SARS-COV-2 AG RESP QL IA.RAPID: (no result)
SODIUM SERPL-SCNC: 140 MMOL/L (ref 136–145)
WBC # BLD AUTO: 6.6 10X3/UL (ref 4.8–10.8)

## 2021-03-10 NOTE — MORECARE
CASE MANAGEMENT DISCHARGE SUMMARY
 
 
PATIENT: DORINDA BENITEZ                        UNIT: [id number]
ACCOUNT#: [id number]                       ADM DATE: 03/10/21
AGE: 55     : [birth year]  SEX: F            ROOM/BED: D.E17     
AUTHOR: ARACELISDOC                             PHYSICIAN:                               
 
REFERRING PHYSICIAN: BALBINA CASON MD              
DATE OF SERVICE: 03/10/21
Discharge Plan
 
 
Patient Name: DORINDA BENITEZ
Facility: [facility]:[place]
Encounter #: [id number]
Medical Record #: [id number]
: 1965
Planned Disposition: 
Anticipated Discharge Date: 
 
Discharge Date: 
Expected LOS: 
Initial Reviewer: [id number]
Initial Review Date: 03/10/2021
Generated: 3/10/21   9:26 pm 
DCP- Discharge Planning
 
Updated by [email or web address]: Dana Parmar on 3/10/21   7:18 pm CT
Patient Name: DORINDA BENITEZ                                     
Admission Status: ER   
Accout number: [id number]                              
Admission Date: 03-   
: 1965                                                        
Admission Diagnosis:   
Attending: BALBINA GONSALEZ                                                
Current LOS:  1   
  
Anticipated DC Date:    
Planned Disposition:    
Primary Insurance: MEDICARE A & B   
  
  
Discharge Planning Comments: CM spoke with patient for discharge planning.  
Patient states that she lives at home with her siblings. Patient is blind and 
has dialysis MWF in [place]. Patient has Rt BKA  and is complaining of severe 
pain of Left lower ext. Patient has told staff that she doesn't want to live 
anymore if she continues to have the pain. Patient was very drowsy during CM 
interview. CM will need to readdress d/c plan after Psych and Dermatology 
sees patient. Maybe at that time she will be more responsive to making a plan.
 
 CM will continue to follow and assist as needed with discharge planning / 
needs.  
  
  
  
  
  
  
: Dana Parmar
 DCPIA - Discharge Planning Initial Assessment
 
Updated by [email or web address]: Dana Parmar on 3/10/21   8:03 pm
*  Is the patient Alert and Oriented?
Yes
*  How many steps to enter\exit or inside your home?
 
*  PCP
[facility]
*  Pharmacy
[facility]
*  Preadmission Environment
Home with Family
*  ADLs
Partial Dependent
*  Partial ADLs (Assistance needed)
Ambulation
Bathing
Dressing
Eating
Medication Management
Toileting
Transfers
*  Equipment
None
*  List name and contact numbers for known caregivers / representatives who 
currently or will assist patient after discharge:
TASNEEM MCKEON - DAUGHTER - [phone]
*  Verbal permission to speak to the caregivers and representatives has been 
obtained from the patient.
Yes
*  Community resources currently utilized
None
*  Additional services required to return to the preadmission environment?
No
*  Can the patient safely return to the preadmission environment?
Yes
*  Has this patient been hospitalized within the prior 30 days at any 
hospital?
No
 
 
 
 
 
 
 
Patient Name: DORINDA BENITEZ
Encounter #: [id number]
Page [phone]
 
 
 
 
 
Electronically Signed by PATRICIA HSU on 03/10/21 at 
 
 
 
 
 
 
**All edits/amendments must be made on the electronic document**
 
DICTATION DATE: 03/10/21 2026     : FOX  03/10/21 2026     
RPT#: [id number]                                DC DATE:        
                                               STATUS: ADM IN  
[facility]
 [place] [address]
***END OF REPORT***

## 2021-03-11 VITALS — SYSTOLIC BLOOD PRESSURE: 132 MMHG | DIASTOLIC BLOOD PRESSURE: 76 MMHG

## 2021-03-11 LAB
ALBUMIN SERPL-MCNC: 3.4 G/DL (ref 3.4–5)
ALP SERPL-CCNC: 153 U/L (ref 30–120)
ALT SERPL-CCNC: 34 U/L (ref 10–68)
ANION GAP SERPL CALC-SCNC: 13.6 MMOL/L (ref 8–16)
BASOPHILS NFR BLD AUTO: 0.9 % (ref 0–2)
BILIRUB SERPL-MCNC: 0.28 MG/DL (ref 0.2–1.3)
BUN SERPL-MCNC: 43 MG/DL (ref 7–18)
CALCIUM SERPL-MCNC: 8.1 MG/DL (ref 8.5–10.1)
CHLORIDE SERPL-SCNC: 103 MMOL/L (ref 98–107)
CO2 SERPL-SCNC: 26.2 MMOL/L (ref 21–32)
CREAT SERPL-MCNC: 7.3 MG/DL (ref 0.6–1.3)
EOSINOPHIL NFR BLD: 6.4 % (ref 0–7)
ERYTHROCYTE [DISTWIDTH] IN BLOOD BY AUTOMATED COUNT: 13.7 % (ref 11.5–14.5)
GLOBULIN SER-MCNC: 4.2 G/L
GLUCOSE SERPL-MCNC: 97 MG/DL (ref 74–106)
HCT VFR BLD CALC: 33 % (ref 36–48)
HGB BLD-MCNC: 10.6 G/DL (ref 12–16)
IMM GRANULOCYTES NFR BLD: 0.2 % (ref 0–5)
LYMPHOCYTES # BLD: 1.66 10X3/UL (ref 1.18–3.74)
LYMPHOCYTES NFR BLD AUTO: 35.6 % (ref 15–50)
MAGNESIUM SERPL-MCNC: 2 MG/DL (ref 1.8–2.4)
MCH RBC QN AUTO: 33.5 PG (ref 26–34)
MCHC RBC AUTO-ENTMCNC: 32.1 G/DL (ref 31–37)
MCV RBC: 104.4 FL (ref 80–100)
MONOCYTES NFR BLD: 10.3 % (ref 2–11)
NEUTROPHILS # BLD: 2.17 10X3/UL (ref 1.56–6.13)
NEUTROPHILS NFR BLD AUTO: 46.6 % (ref 40–80)
OSMOLALITY SERPL CALC.SUM OF ELEC: 286 MOSM/KG (ref 275–300)
PLATELET # BLD: 234 10X3/UL (ref 130–400)
PMV BLD AUTO: 8.9 FL (ref 7.4–10.4)
POTASSIUM SERPL-SCNC: 4.8 MMOL/L (ref 3.5–5.1)
PROT SERPL-MCNC: 7.6 G/DL (ref 6.4–8.2)
RBC # BLD AUTO: 3.16 10X6/UL (ref 4–5.4)
SODIUM SERPL-SCNC: 138 MMOL/L (ref 136–145)
WBC # BLD AUTO: 4.7 10X3/UL (ref 4.8–10.8)

## 2021-03-11 NOTE — MORECARE
CASE MANAGEMENT DISCHARGE SUMMARY
 
 
PATIENT: DORINDA BENITEZ                        UNIT: [id number]
ACCOUNT#: [id number]                       ADM DATE: 03/10/21
AGE: 55     : 61  SEX: F            ROOM/BED: D.E17     
AUTHOR: ARACELISDOC                             PHYSICIAN:                               
 
REFERRING PHYSICIAN: BALBINA CASON MD              
DATE OF SERVICE: 21
Discharge Plan
 
 
Patient Name: DORINDA BENITEZ
Facility: [facility]
Encounter #: [id number]
Medical Record #: [id number]
: 1965
Planned Disposition: 
Anticipated Discharge Date: 
 
Discharge Date: 2021
Expected LOS: 
Initial Reviewer: [email or web address]
Initial Review Date: 03/10/2021
Generated: 3/11/21   3:45 pm 
DCP- Discharge Planning
 
Updated by [id number]: Dana Parmar on 3/10/21   7:18 pm CT
Patient Name: DORINDA BENITEZ                                     
Admission Status: ER   
Accout number: [id number]                              
Admission Date: 03-   
: 1965                                                        
Admission Diagnosis:   
Attending: BALBINA GONSALEZ                                                
Current LOS:  1   
  
Anticipated DC Date:    
Planned Disposition:    
Primary Insurance: MEDICARE A & B   
  
  
Discharge Planning Comments: CM spoke with patient for discharge planning.  
Patient states that she lives at home with her siblings. Patient is blind and 
has dialysis MWF in [place]. Patient has Rt BKA  and is complaining of severe 
pain of Left lower ext. Patient has told staff that she doesn't want to live 
anymore if she continues to have the pain. Patient was very drowsy during CM 
interview. CM will need to readdress d/c plan after Psych and Dermatology 
sees patient. Maybe at that time she will be more responsive to making a plan.
 
 CM will continue to follow and assist as needed with discharge planning / 
needs.  
  
  
  
  
  
  
: Dana Parmar
 DCPIA - Discharge Planning Initial Assessment
 
Updated by [email or web address]: Dana Parmar on 3/10/21   8:03 pm
*  Is the patient Alert and Oriented?
Yes
*  How many steps to enter\exit or inside your home?
 
*  PCP
[facility]
*  Pharmacy
[facility]
*  Preadmission Environment
Home with Family
*  ADLs
Partial Dependent
*  Partial ADLs (Assistance needed)
Ambulation
Bathing
Dressing
Eating
Medication Management
Toileting
Transfers
*  Equipment
None
*  List name and contact numbers for known caregivers / representatives who 
currently or will assist patient after discharge:
TASNEEM MCKEON - DAUGHTER - [phone]
*  Verbal permission to speak to the caregivers and representatives has been 
obtained from the patient.
Yes
*  Community resources currently utilized
None
*  Additional services required to return to the preadmission environment?
No
*  Can the patient safely return to the preadmission environment?
Yes
*  Has this patient been hospitalized within the prior 30 days at any 
hospital?
No
 
 
 
 
 
 
 
 
Last DP export: 3/10/21   7:26 p
Patient Name: DORINDA BENITEZ
Encounter #: [id number]
Page [id number]
 
 
 
 
 
Electronically Signed by PATRICIA HSU on 21 at 1445
 
 
 
 
 
 
**All edits/amendments must be made on the electronic document**
 
DICTATION DATE: 21     : FOX  21     
RPT#: [id number]                                DC DATE:21
                                               STATUS: DIS IN  
[facility]
 [place] [address]
***END OF REPORT***

## 2021-03-11 NOTE — CN
PATIENT NAME:DORINDA BENITEZ                                  MEDICAL RECORD: [id number]
: 61                                              LOCATION:[facility].E17-
ADMIT DATE: 03/10/21                                       ACCOUNT: [id number]
CONSULTING PHYSICIAN:    ANIKET BENSON MD             
                                               
REFERRING PHYSICIAN:     BALBINA CASON MD              
 
 
DATE OF CONSULTATION:  03/10/2021
 
IDENTIFYING DATA:  The patient is 55 years old and she is admitted to the
hospital on a voluntary basis.
 
CHIEF COMPLAINT:  Pain.
 
HISTORY OF PRESENT ILLNESS:  The patient is a very unfortunate woman, who has a
number of medical problems.  She has a terrible case of psoriasis and renal
failure associated with it.  She also has leg pain associated with the
psoriasis.  She is endorsing numerous neurovegetative depressive symptoms and
says that she is wanting to kill herself.  She couples this with the statement
that she wants to kill herself because she is in such chronic pain.  It does not
seem or sound to be manipulative.  It is not one of those situations where she
is demanding an opiate or she will kill herself.  The situation seems genuine,
real, and she does appear to be quite uncomfortable.
 
IMPRESSION:  Major depression.
 
PLAN:  At this time, the patient has a number of medical problems including
blindness, which I neglected to mention above and very understandable reasons
why she would be depressed.  She has never attempted to harm herself in the past
and does tell me that she has strong Yazidi views that are contrary to
committing suicide.  She is not appropriate for an inpatient psychiatric unit
because of her many medical needs and I do not think she would represent a risk
to herself or be inappropriate for placement in a nursing home or some sort of
palliative care setting.  She does need to be on antidepressant medications.
 
TRANSINT:[id number] Voice Confirmation ID: [id number] DOCUMENT ID: [id number]
                                           
                                           ANIKET BENSON MD             
 
 
 
Electronically Signed by ANIKET BENSON on 21 at 1601
 
 
 
 
 
 
 
CC:                                                             [id number]
DICTATION DATE: 03/10/21 161     :     03/10/21 2236      DIS IN  
                                                                      21
[facility]                                          
[address] [place] [address]
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
